# Patient Record
Sex: MALE | Race: WHITE | Employment: FULL TIME | URBAN - NONMETROPOLITAN AREA
[De-identification: names, ages, dates, MRNs, and addresses within clinical notes are randomized per-mention and may not be internally consistent; named-entity substitution may affect disease eponyms.]

---

## 2019-11-26 ENCOUNTER — OFFICE VISIT (OUTPATIENT)
Dept: PRIMARY CARE CLINIC | Age: 54
End: 2019-11-26
Payer: COMMERCIAL

## 2019-11-26 VITALS
BODY MASS INDEX: 28.86 KG/M2 | SYSTOLIC BLOOD PRESSURE: 169 MMHG | WEIGHT: 204 LBS | TEMPERATURE: 98 F | OXYGEN SATURATION: 99 % | DIASTOLIC BLOOD PRESSURE: 107 MMHG | HEART RATE: 102 BPM

## 2019-11-26 DIAGNOSIS — J01.40 ACUTE PANSINUSITIS, RECURRENCE NOT SPECIFIED: Primary | ICD-10-CM

## 2019-11-26 PROCEDURE — 99213 OFFICE O/P EST LOW 20 MIN: CPT | Performed by: NURSE PRACTITIONER

## 2019-11-26 RX ORDER — AMOXICILLIN AND CLAVULANATE POTASSIUM 875; 125 MG/1; MG/1
1 TABLET, FILM COATED ORAL 2 TIMES DAILY
Qty: 20 TABLET | Refills: 0 | Status: SHIPPED | OUTPATIENT
Start: 2019-11-26 | End: 2019-12-06

## 2019-11-26 RX ORDER — FLUTICASONE PROPIONATE 50 MCG
2 SPRAY, SUSPENSION (ML) NASAL DAILY
Qty: 1 BOTTLE | Refills: 0 | Status: SHIPPED | OUTPATIENT
Start: 2019-11-26 | End: 2020-03-20

## 2019-11-26 ASSESSMENT — ENCOUNTER SYMPTOMS
RHINORRHEA: 1
WHEEZING: 0
SINUS PAIN: 1
SINUS PRESSURE: 1
SORE THROAT: 1
COUGH: 1
GASTROINTESTINAL NEGATIVE: 1
SHORTNESS OF BREATH: 0
EYES NEGATIVE: 1

## 2020-03-20 ENCOUNTER — OFFICE VISIT (OUTPATIENT)
Dept: FAMILY MEDICINE CLINIC | Age: 55
End: 2020-03-20
Payer: COMMERCIAL

## 2020-03-20 VITALS
BODY MASS INDEX: 28.04 KG/M2 | HEIGHT: 72 IN | DIASTOLIC BLOOD PRESSURE: 90 MMHG | OXYGEN SATURATION: 98 % | WEIGHT: 207 LBS | SYSTOLIC BLOOD PRESSURE: 180 MMHG | TEMPERATURE: 98.2 F | HEART RATE: 96 BPM

## 2020-03-20 PROCEDURE — 99213 OFFICE O/P EST LOW 20 MIN: CPT | Performed by: NURSE PRACTITIONER

## 2020-03-20 RX ORDER — LISINOPRIL 20 MG/1
20 TABLET ORAL DAILY
Qty: 30 TABLET | Refills: 2 | Status: SHIPPED | OUTPATIENT
Start: 2020-03-20 | End: 2020-04-21 | Stop reason: SDUPTHER

## 2020-03-20 SDOH — ECONOMIC STABILITY: FOOD INSECURITY: WITHIN THE PAST 12 MONTHS, THE FOOD YOU BOUGHT JUST DIDN'T LAST AND YOU DIDN'T HAVE MONEY TO GET MORE.: NEVER TRUE

## 2020-03-20 SDOH — ECONOMIC STABILITY: FOOD INSECURITY: WITHIN THE PAST 12 MONTHS, YOU WORRIED THAT YOUR FOOD WOULD RUN OUT BEFORE YOU GOT MONEY TO BUY MORE.: NEVER TRUE

## 2020-03-20 SDOH — ECONOMIC STABILITY: TRANSPORTATION INSECURITY
IN THE PAST 12 MONTHS, HAS LACK OF TRANSPORTATION KEPT YOU FROM MEETINGS, WORK, OR FROM GETTING THINGS NEEDED FOR DAILY LIVING?: NO

## 2020-03-20 SDOH — ECONOMIC STABILITY: TRANSPORTATION INSECURITY
IN THE PAST 12 MONTHS, HAS THE LACK OF TRANSPORTATION KEPT YOU FROM MEDICAL APPOINTMENTS OR FROM GETTING MEDICATIONS?: NO

## 2020-03-20 SDOH — ECONOMIC STABILITY: INCOME INSECURITY: HOW HARD IS IT FOR YOU TO PAY FOR THE VERY BASICS LIKE FOOD, HOUSING, MEDICAL CARE, AND HEATING?: NOT HARD AT ALL

## 2020-03-20 ASSESSMENT — PATIENT HEALTH QUESTIONNAIRE - PHQ9
SUM OF ALL RESPONSES TO PHQ9 QUESTIONS 1 & 2: 0
2. FEELING DOWN, DEPRESSED OR HOPELESS: 0
SUM OF ALL RESPONSES TO PHQ QUESTIONS 1-9: 0
1. LITTLE INTEREST OR PLEASURE IN DOING THINGS: 0
SUM OF ALL RESPONSES TO PHQ QUESTIONS 1-9: 0

## 2020-03-20 ASSESSMENT — ENCOUNTER SYMPTOMS
COUGH: 1
SINUS PAIN: 1
SORE THROAT: 1
NAUSEA: 0
SWOLLEN GLANDS: 1
DIARRHEA: 0
VOMITING: 0

## 2020-03-20 NOTE — LETTER
[address] [id number]  Phone: [phone]  Fax: Leo Carvalho [phone], APRN - CNP        March 20, 2020    Bryon Zaidi  [address] [facility] [address]      Dear Heather Hernandez:    Coricidin HBP Cold and Cough 1 tab every 6 hours as needed (nasal decongestant and antihistamine)  Flonase 1 spray each nostril twice daily (nasal steroid)  Ibuprofen 3 times a day (antiinflammatory)   Warm tea with 1tbsp honey (soothes the throat)  Increase water intake  Rest    If you have any questions or concerns, please don't hesitate to call.     Sincerely,          QING Carson - NILO

## 2020-03-20 NOTE — PROGRESS NOTES
family history on file. Review of Systems:         Review of Systems   HENT: Positive for congestion, sinus pain and sore throat. Negative for sneezing. Respiratory: Positive for cough. Gastrointestinal: Negative for diarrhea, nausea and vomiting. Neurological: Positive for headaches. Physical Exam:     Vitals:  BP (!) 180/90 (Site: Right Upper Arm)   Pulse 96   Temp 98.2 °F (36.8 °C) (Oral)   Ht 6' (1.829 m)   Wt 207 lb (93.9 kg)   SpO2 98%   BMI 28.07 kg/m²       Physical Exam  Vitals signs and nursing note reviewed. Constitutional:       Appearance: He is well-developed. HENT:      Head: Normocephalic. Right Ear: Hearing, tympanic membrane and external ear normal.      Left Ear: Tympanic membrane and external ear normal.      Nose: Nose normal.      Mouth/Throat:      Pharynx: Uvula midline. Posterior oropharyngeal erythema present. No oropharyngeal exudate. Eyes:      Conjunctiva/sclera: Conjunctivae normal.      Pupils: Pupils are equal, round, and reactive to light. Neck:      Musculoskeletal: Normal range of motion and neck supple. Vascular: No carotid bruit. Cardiovascular:      Rate and Rhythm: Normal rate and regular rhythm. Pulses:           Dorsalis pedis pulses are 2+ on the right side and 2+ on the left side. Heart sounds: Normal heart sounds, S1 normal and S2 normal.   Pulmonary:      Effort: Pulmonary effort is normal. No respiratory distress. Breath sounds: Normal breath sounds. Abdominal:      Palpations: Abdomen is soft. Tenderness: There is no abdominal tenderness. Musculoskeletal: Normal range of motion. Skin:     General: Skin is warm and dry. Findings: No rash. Neurological:      Mental Status: He is alert and oriented to person, place, and time. GCS: GCS eye subscore is 4. GCS verbal subscore is 5. GCS motor subscore is 6. Deep Tendon Reflexes: Reflexes are normal and symmetric.    Psychiatric: No orders of the defined types were placed in this encounter. There are no Patient Instructions on file for this visit. Electronically signed by QING Martinez CNP on 3/20/2020 at 2:49 PM           Completed Refills      Requested Prescriptions     Signed Prescriptions Disp Refills    lisinopril (PRINIVIL;ZESTRIL) 20 MG tablet 30 tablet 2     Sig: Take 1 tablet by mouth daily         Candy Mons received counseling on the following healthy behaviors: nutrition, exercise, medication adherence and tobacco cessation  Reviewed prior labs and health maintenance. Continue current medications, diet and exercise. Discussed use, benefit, and side effects of prescribed medications. Barriers to medication compliance addressed. Patient given educational materials - see patient instructions. All patient questions answered. Patient voiced understanding.

## 2020-04-21 ENCOUNTER — OFFICE VISIT (OUTPATIENT)
Dept: FAMILY MEDICINE CLINIC | Age: 55
End: 2020-04-21
Payer: COMMERCIAL

## 2020-04-21 VITALS
BODY MASS INDEX: 27.9 KG/M2 | DIASTOLIC BLOOD PRESSURE: 78 MMHG | SYSTOLIC BLOOD PRESSURE: 160 MMHG | HEART RATE: 93 BPM | HEIGHT: 72 IN | WEIGHT: 206 LBS | OXYGEN SATURATION: 98 %

## 2020-04-21 LAB
CONTROL: PRESENT
HEMOCCULT STL QL: POSITIVE

## 2020-04-21 PROCEDURE — 99213 OFFICE O/P EST LOW 20 MIN: CPT | Performed by: NURSE PRACTITIONER

## 2020-04-21 PROCEDURE — 82274 ASSAY TEST FOR BLOOD FECAL: CPT | Performed by: NURSE PRACTITIONER

## 2020-04-21 RX ORDER — LISINOPRIL 40 MG/1
40 TABLET ORAL DAILY
Qty: 30 TABLET | Refills: 2 | Status: SHIPPED | OUTPATIENT
Start: 2020-04-21 | End: 2020-07-16 | Stop reason: SDUPTHER

## 2020-04-21 ASSESSMENT — ENCOUNTER SYMPTOMS
VOMITING: 0
COUGH: 0
DIARRHEA: 0
NAUSEA: 0
SHORTNESS OF BREATH: 0

## 2020-04-21 NOTE — PATIENT INSTRUCTIONS
SURVEY:    You may be receiving a survey from [employer] regarding your visit today. You may get this in the mail, through your MyChart or in your email. Please complete the survey to enable us to provide the highest quality of care to you and your family. If you cannot score us as very good ( 5 Stars) on any question, please feel free to call the office to discuss how we could have made your experience exceptional.     Thank you.     Clinical Care Team:         QING Wild-NILO Cole, [address] [place] [address] Team:                Michelle España

## 2020-04-21 NOTE — PROGRESS NOTES
Negative for cough and shortness of breath. Cardiovascular: Negative for chest pain and palpitations. Gastrointestinal: Negative for diarrhea, nausea and vomiting. Neurological: Negative for dizziness, seizures and headaches. Physical Exam:     Vitals:  BP (!) 160/78 (Site: Left Upper Arm, Position: Sitting, Cuff Size: Medium Adult)   Pulse 93   Ht 6' (1.829 m)   Wt 206 lb (93.4 kg)   SpO2 98%   BMI 27.94 kg/m²       Physical Exam  Vitals signs and nursing note reviewed. Constitutional:       Appearance: He is well-developed. Cardiovascular:      Rate and Rhythm: Normal rate and regular rhythm. Heart sounds: S1 normal and S2 normal.   Pulmonary:      Effort: Pulmonary effort is normal. No respiratory distress. Breath sounds: Normal breath sounds. Abdominal:      General: Bowel sounds are normal.      Palpations: Abdomen is soft. Tenderness: There is no abdominal tenderness. Skin:     General: Skin is warm and dry. Psychiatric:         Behavior: Behavior is cooperative. Data:     No results found for: NA, K, CL, CO2, BUN, CREATININE, GLUCOSE, PROT, LABALBU, BILITOT, ALKPHOS, AST, ALT  No results found for: WBC, RBC, HGB, HCT, MCV, MCH, MCHC, RDW, PLT, MPV  No results found for: TSH  No results found for: CHOL, HDL, PSA, LABA1C       Assessment & Plan        Diagnosis Orders   1. Essential hypertension  --BP not controlled at this time, however it is lower than previous. Will increase lisinopril to 40 mg p.o. daily. Will recheck in about 4 to 6 weeks. 2. Positive FIT (fecal immunochemical test)  --Patient advised that he should get a diagnostic colonoscopy. Patient refuses this. Patient educated that the source of the occult blood is not known and could be something simple like hemorrhoids or it could be something complex like colon cancer. Patient educated that with all cancers early detection is the matt to good outcomes.   Patient educated about how smoking increases the overall risk also. Patient refuses referral for Dr Rico Painting. Patient states \"when I get more financially stable I will think about it. Will have to go at some point\". Patient verbalizes understanding and agreement with plan. All questions answered. If symptoms do not resolve or worsen, return to office. Completed Refills   Requested Prescriptions     Signed Prescriptions Disp Refills    lisinopril (PRINIVIL;ZESTRIL) 40 MG tablet 30 tablet 2     Sig: Take 1 tablet by mouth daily     No follow-ups on file. Orders Placed This Encounter   Medications    lisinopril (PRINIVIL;ZESTRIL) 40 MG tablet     Sig: Take 1 tablet by mouth daily     Dispense:  30 tablet     Refill:  2     Orders Placed This Encounter   Procedures    POCT Fecal Immunochemical Test (FIT)     Colorectal cancer screening in lieu of colonoscopy         Patient Instructions   SURVEY:    You may be receiving a survey from [employer] regarding your visit today. You may get this in the mail, through your MyChart or in your email. Please complete the survey to enable us to provide the highest quality of care to you and your family. If you cannot score us as very good ( 5 Stars) on any question, please feel free to call the office to discuss how we could have made your experience exceptional.     Thank you.     Clinical Care Team:         MINDY Goldmannett, [address] [place] [address] Team:                Shaunna Quiroz                                     Electronically signed by QING Goldman CNP on 4/21/2020 at 1:27 PM           Completed Refills      Requested Prescriptions     Signed Prescriptions Disp Refills    lisinopril (PRINIVIL;ZESTRIL) 40 MG tablet 30 tablet 2     Sig: Take 1 tablet by mouth daily         Monalisa Harada received counseling on the following healthy behaviors: nutrition, exercise and medication adherence  Reviewed prior labs and health maintenance. Continue current medications, diet and exercise. Discussed use, benefit, and side effects of prescribed medications. Barriers to medication compliance addressed. Patient given educational materials - see patient instructions. All patient questions answered. Patient voiced understanding.

## 2020-06-11 ENCOUNTER — OFFICE VISIT (OUTPATIENT)
Dept: FAMILY MEDICINE CLINIC | Age: 55
End: 2020-06-11
Payer: COMMERCIAL

## 2020-06-11 VITALS
WEIGHT: 201 LBS | DIASTOLIC BLOOD PRESSURE: 90 MMHG | BODY MASS INDEX: 27.26 KG/M2 | OXYGEN SATURATION: 98 % | SYSTOLIC BLOOD PRESSURE: 160 MMHG | HEART RATE: 90 BPM

## 2020-06-11 PROCEDURE — 99213 OFFICE O/P EST LOW 20 MIN: CPT | Performed by: NURSE PRACTITIONER

## 2020-06-11 RX ORDER — METOPROLOL SUCCINATE 50 MG/1
50 TABLET, EXTENDED RELEASE ORAL DAILY
Qty: 30 TABLET | Refills: 2 | Status: SHIPPED | OUTPATIENT
Start: 2020-06-11 | End: 2020-09-17 | Stop reason: SDUPTHER

## 2020-06-11 ASSESSMENT — ENCOUNTER SYMPTOMS
VOMITING: 0
NAUSEA: 0
COUGH: 0
SHORTNESS OF BREATH: 0
DIARRHEA: 0

## 2020-06-11 NOTE — PROGRESS NOTES
HPI Notes    Name: Олег Merrill  : 1965         Chief Complaint:     Chief Complaint   Patient presents with    Hypertension     Patient here today for recheck on HTN       History of Present Illness:        Hypertension   This is a chronic problem. The current episode started more than 1 month ago. The problem has been waxing and waning since onset. The problem is uncontrolled. Pertinent negatives include no chest pain, headaches, palpitations, peripheral edema or shortness of breath. Risk factors for coronary artery disease include male gender, smoking/tobacco exposure and stress. Past treatments include ACE inhibitors. The current treatment provides mild improvement. Past Medical History:     Past Medical History:   Diagnosis Date    Chronic back pain       Reviewed all health maintenance requirements and ordered appropriate tests  Health Maintenance Due   Topic Date Due    Potassium monitoring  1966    Creatinine monitoring  1966    Pneumococcal 0-64 years Vaccine (1 of 1 - PPSV23) 1972    HIV screen  1981    DTaP/Tdap/Td vaccine (1 - Tdap) 1985    Lipid screen  2006    Diabetes screen  2006    Shingles Vaccine (1 of 2) 2016       Past Surgical History:     Past Surgical History:   Procedure Laterality Date    HERNIA REPAIR          Medications:       Prior to Admission medications    Medication Sig Start Date End Date Taking? Authorizing Provider   metoprolol succinate (TOPROL XL) 50 MG extended release tablet Take 1 tablet by mouth daily 20  Yes QING Marsh CNP   lisinopril (PRINIVIL;ZESTRIL) 40 MG tablet Take 1 tablet by mouth daily 20  Yes QING Marsh CNP        Allergies:       Patient has no known allergies. Social History:     Tobacco:    reports that he has been smoking cigarettes. He has never used smokeless tobacco.  Alcohol:      reports current alcohol use.   Drug Use:  has no

## 2020-06-25 ENCOUNTER — NURSE ONLY (OUTPATIENT)
Dept: FAMILY MEDICINE CLINIC | Age: 55
End: 2020-06-25

## 2020-06-25 VITALS — DIASTOLIC BLOOD PRESSURE: 80 MMHG | SYSTOLIC BLOOD PRESSURE: 138 MMHG | HEART RATE: 90 BPM

## 2020-06-25 NOTE — PROGRESS NOTES
BP reviewed with MINDY Vital. Patient to continue with same medications and be seen in 3 weeks. /82, and 138/80.  Pulse 90

## 2020-07-16 ENCOUNTER — OFFICE VISIT (OUTPATIENT)
Dept: FAMILY MEDICINE CLINIC | Age: 55
End: 2020-07-16
Payer: COMMERCIAL

## 2020-07-16 VITALS
BODY MASS INDEX: 27.94 KG/M2 | DIASTOLIC BLOOD PRESSURE: 94 MMHG | SYSTOLIC BLOOD PRESSURE: 162 MMHG | OXYGEN SATURATION: 97 % | WEIGHT: 206 LBS | HEART RATE: 90 BPM

## 2020-07-16 PROCEDURE — 99214 OFFICE O/P EST MOD 30 MIN: CPT | Performed by: NURSE PRACTITIONER

## 2020-07-16 RX ORDER — LISINOPRIL 40 MG/1
40 TABLET ORAL DAILY
Qty: 30 TABLET | Refills: 1 | Status: SHIPPED | OUTPATIENT
Start: 2020-07-16 | End: 2020-09-17 | Stop reason: SDUPTHER

## 2020-07-16 ASSESSMENT — ENCOUNTER SYMPTOMS
VOMITING: 0
COUGH: 0
SHORTNESS OF BREATH: 0
NAUSEA: 0
DIARRHEA: 0

## 2020-07-16 NOTE — PROGRESS NOTES
HPI Notes    Name: Tomasz Becker  : 1965         Chief Complaint:     Chief Complaint   Patient presents with    Hypertension     Patient here today for HTN check up. He has not had BP meds yet today, he takes them after work daily. History of Present Illness:        Hypertension   This is a chronic problem. The current episode started more than 1 year ago. The problem is controlled. Pertinent negatives include no chest pain, headaches, palpitations, peripheral edema or shortness of breath. Risk factors for coronary artery disease include male gender, obesity and smoking/tobacco exposure. Past treatments include calcium channel blockers and ACE inhibitors. The current treatment provides moderate improvement. Compliance problems include diet and exercise. Past Medical History:     Past Medical History:   Diagnosis Date    Chronic back pain       Reviewed all health maintenance requirements and ordered appropriate tests  Health Maintenance Due   Topic Date Due    Potassium monitoring  1966    Creatinine monitoring  1966    Pneumococcal 0-64 years Vaccine (1 of 1 - PPSV23) 1972    HIV screen  1981    DTaP/Tdap/Td vaccine (1 - Tdap) 1985    Lipid screen  2006    Diabetes screen  2006    Shingles Vaccine (1 of 2) 2016       Past Surgical History:     Past Surgical History:   Procedure Laterality Date    HERNIA REPAIR          Medications:       Prior to Admission medications    Medication Sig Start Date End Date Taking? Authorizing Provider   metoprolol succinate (TOPROL XL) 50 MG extended release tablet Take 1 tablet by mouth daily 20  Yes QING Ross CNP   lisinopril (PRINIVIL;ZESTRIL) 40 MG tablet Take 1 tablet by mouth daily 20  Yes QING Ross CNP        Allergies:       Patient has no known allergies. Social History:     Tobacco:    reports that he has been smoking cigarettes.  He has never used smokeless tobacco.  Alcohol:      reports current alcohol use. Drug Use:  has no history on file for drug. Family History:      No family history on file. Review of Systems:         Review of Systems   Constitutional: Negative for chills and fever. Respiratory: Negative for cough and shortness of breath. Cardiovascular: Negative for chest pain and palpitations. Gastrointestinal: Negative for diarrhea, nausea and vomiting. Neurological: Negative for dizziness, seizures and headaches. Physical Exam:     Vitals:  BP (!) 162/94 (Site: Left Upper Arm, Position: Sitting, Cuff Size: Medium Adult)   Pulse 90   Wt 206 lb (93.4 kg)   SpO2 97%   BMI 27.94 kg/m²       Physical Exam  Vitals signs and nursing note reviewed. Constitutional:       Appearance: He is well-developed. Cardiovascular:      Rate and Rhythm: Normal rate and regular rhythm. Heart sounds: S1 normal and S2 normal.   Pulmonary:      Effort: Pulmonary effort is normal. No respiratory distress. Breath sounds: Normal breath sounds. Abdominal:      General: Bowel sounds are normal.      Palpations: Abdomen is soft. Tenderness: There is no abdominal tenderness. Skin:     General: Skin is warm and dry. Psychiatric:         Behavior: Behavior is cooperative. Data:     No results found for: NA, K, CL, CO2, BUN, CREATININE, GLUCOSE, PROT, LABALBU, BILITOT, ALKPHOS, AST, ALT  No results found for: WBC, RBC, HGB, HCT, MCV, MCH, MCHC, RDW, PLT, MPV  No results found for: TSH  No results found for: CHOL, HDL, PSA, LABA1C       Assessment & Plan        Diagnosis Orders   1. Essential hypertension       Patient not willing to change any medications at this time. Patient instructed to bring multiple blood pressure and pulse readings to the office over the next 2 to 3 weeks. Will make a determination at that time. Patient verbalizes understanding and agreement with plan. All questions answered.  If symptoms do not resolve or worsen, return to office. Completed Refills   Requested Prescriptions      No prescriptions requested or ordered in this encounter     No follow-ups on file. No orders of the defined types were placed in this encounter. No orders of the defined types were placed in this encounter. Patient Instructions   SURVEY:    You may be receiving a survey from [employer] regarding your visit today. Please complete the survey to enable us to provide the highest quality of care to you and your family. If you cannot score us a very good (5 Stars) on any question, please call the office to discuss how we could have made your experience a very good one. Thank you. Clinical Care Team: MINDY Olson LPN    Clerical Team: Demi Luis        Electronically signed by QING Olson CNP on 7/16/2020 at 3:37 PM           Completed Refills      Requested Prescriptions      No prescriptions requested or ordered in this encounter         Marvin Tinsley received counseling on the following healthy behaviors: medication adherence  Reviewed prior labs and health maintenance. Continue current medications, diet and exercise. Discussed use, benefit, and side effects of prescribed medications. Barriers to medication compliance addressed. Patient given educational materials - see patient instructions. All patient questions answered. Patient voiced understanding.

## 2020-07-16 NOTE — PATIENT INSTRUCTIONS
SURVEY:    You may be receiving a survey from [employer] regarding your visit today. Please complete the survey to enable us to provide the highest quality of care to you and your family. If you cannot score us a very good (5 Stars) on any question, please call the office to discuss how we could have made your experience a very good one. Thank you.     Clinical Care Team: QING Bahena-NILO Bianchi LPN    Clerical Team: Franco Polanco

## 2020-09-17 ENCOUNTER — OFFICE VISIT (OUTPATIENT)
Dept: FAMILY MEDICINE CLINIC | Age: 55
End: 2020-09-17
Payer: COMMERCIAL

## 2020-09-17 VITALS
SYSTOLIC BLOOD PRESSURE: 162 MMHG | DIASTOLIC BLOOD PRESSURE: 84 MMHG | HEIGHT: 72 IN | OXYGEN SATURATION: 98 % | TEMPERATURE: 99.1 F | HEART RATE: 97 BPM | WEIGHT: 204 LBS | BODY MASS INDEX: 27.63 KG/M2

## 2020-09-17 PROCEDURE — 99213 OFFICE O/P EST LOW 20 MIN: CPT | Performed by: NURSE PRACTITIONER

## 2020-09-17 RX ORDER — LISINOPRIL 40 MG/1
40 TABLET ORAL DAILY
Qty: 30 TABLET | Refills: 2 | Status: SHIPPED | OUTPATIENT
Start: 2020-09-17 | End: 2020-12-17 | Stop reason: SDUPTHER

## 2020-09-17 RX ORDER — METOPROLOL SUCCINATE 100 MG/1
100 TABLET, EXTENDED RELEASE ORAL DAILY
Qty: 30 TABLET | Refills: 2 | Status: SHIPPED | OUTPATIENT
Start: 2020-09-17 | End: 2020-12-17 | Stop reason: SDUPTHER

## 2020-09-17 RX ORDER — BLOOD PRESSURE TEST KIT
1 KIT MISCELLANEOUS DAILY
Qty: 1 KIT | Refills: 0 | Status: SHIPPED | OUTPATIENT
Start: 2020-09-17

## 2020-09-17 ASSESSMENT — ENCOUNTER SYMPTOMS
DIARRHEA: 0
NAUSEA: 0
VOMITING: 0
COUGH: 0
SHORTNESS OF BREATH: 0

## 2020-09-17 NOTE — PROGRESS NOTES
HPI Notes    Name: Peggy Ashley  : 1965         Chief Complaint:     Chief Complaint   Patient presents with    Hypertension     Patient presents today for a 2 month check up        History of Present Illness:        Hypertension   This is a chronic problem. The current episode started more than 1 year ago. The problem is uncontrolled. Pertinent negatives include no chest pain, headaches, palpitations, peripheral edema or shortness of breath. Risk factors for coronary artery disease include smoking/tobacco exposure, stress and male gender. Past treatments include ACE inhibitors and beta blockers. The current treatment provides moderate improvement. Compliance problems include diet and exercise. Past Medical History:     Past Medical History:   Diagnosis Date    Chronic back pain       Reviewed all health maintenance requirements and ordered appropriate tests  Health Maintenance Due   Topic Date Due    Potassium monitoring  1966    Creatinine monitoring  1966    Pneumococcal 0-64 years Vaccine (1 of 1 - PPSV23) 1972    HIV screen  1981    DTaP/Tdap/Td vaccine (1 - Tdap) 1985    Lipid screen  2006    Diabetes screen  2006    Shingles Vaccine (1 of 2) 2016    Flu vaccine (1) 2020       Past Surgical History:     Past Surgical History:   Procedure Laterality Date    HERNIA REPAIR          Medications:       Prior to Admission medications    Medication Sig Start Date End Date Taking? Authorizing Provider   metoprolol succinate (TOPROL XL) 100 MG extended release tablet Take 1 tablet by mouth daily 20  Yes Evaristo Ganser, APRN - CNP   Blood Pressure KIT 1 Dose by Does not apply route daily 20  Yes Evaristo Ganser, APRN - CNP   lisinopril (PRINIVIL;ZESTRIL) 40 MG tablet Take 1 tablet by mouth daily 20  Yes Evaristo Ganser, APRN - CNP        Allergies:       Patient has no known allergies.     Social History: Tobacco:    reports that he has been smoking cigarettes. He has never used smokeless tobacco.  Alcohol:      reports current alcohol use. Drug Use:  has no history on file for drug. Family History:      No family history on file. Review of Systems:         Review of Systems   Constitutional: Negative for chills and fever. Respiratory: Negative for cough and shortness of breath. Cardiovascular: Negative for chest pain and palpitations. Gastrointestinal: Negative for diarrhea, nausea and vomiting. Neurological: Negative for dizziness, seizures and headaches. Physical Exam:     Vitals:  BP (!) 162/84 (Site: Right Upper Arm, Position: Sitting, Cuff Size: Medium Adult)   Pulse 97   Temp 99.1 °F (37.3 °C) (Oral)   Ht 6' (1.829 m)   Wt 204 lb (92.5 kg)   SpO2 98%   BMI 27.67 kg/m²       Physical Exam  Vitals signs and nursing note reviewed. Constitutional:       Appearance: He is well-developed. Cardiovascular:      Rate and Rhythm: Normal rate and regular rhythm. Heart sounds: S1 normal and S2 normal.   Pulmonary:      Effort: Pulmonary effort is normal. No respiratory distress. Breath sounds: Normal breath sounds. Abdominal:      General: Bowel sounds are normal.      Palpations: Abdomen is soft. Tenderness: There is no abdominal tenderness. Skin:     General: Skin is warm and dry. Psychiatric:         Behavior: Behavior is cooperative. Data:     No results found for: NA, K, CL, CO2, BUN, CREATININE, GLUCOSE, PROT, LABALBU, BILITOT, ALKPHOS, AST, ALT  No results found for: WBC, RBC, HGB, HCT, MCV, MCH, MCHC, RDW, PLT, MPV  No results found for: TSH  No results found for: CHOL, HDL, PSA, LABA1C       Assessment & Plan        Diagnosis Orders   1. Essential hypertension  lisinopril (PRINIVIL;ZESTRIL) 40 MG tablet     BP still uncontrolled at this time. Continue lisinopril 40 mg p.o. daily. Will increase Toprol-XL to 100 mg p.o. daily.   Will give patient home BP cuff to monitor. Patient educated to record date, blood pressure, and heart rate and bring to next appointment. Patient verbalizes understanding and agreement with plan. All questions answered. If symptoms do not resolve or worsen, return to office. Completed Refills   Requested Prescriptions     Signed Prescriptions Disp Refills    metoprolol succinate (TOPROL XL) 100 MG extended release tablet 30 tablet 2     Sig: Take 1 tablet by mouth daily    Blood Pressure KIT 1 kit 0     Si Dose by Does not apply route daily    lisinopril (PRINIVIL;ZESTRIL) 40 MG tablet 30 tablet 2     Sig: Take 1 tablet by mouth daily     No follow-ups on file. Orders Placed This Encounter   Medications    metoprolol succinate (TOPROL XL) 100 MG extended release tablet     Sig: Take 1 tablet by mouth daily     Dispense:  30 tablet     Refill:  2    Blood Pressure KIT     Si Dose by Does not apply route daily     Dispense:  1 kit     Refill:  0    lisinopril (PRINIVIL;ZESTRIL) 40 MG tablet     Sig: Take 1 tablet by mouth daily     Dispense:  30 tablet     Refill:  2     No orders of the defined types were placed in this encounter. Patient Instructions     SURVEY:    You may be receiving a survey from [employer] regarding your visit today. Please complete the survey to enable us to provide the highest quality of care to you and your family. If you cannot score us a very good on any question, please call the office to discuss how we could of made your experience a very good one. Thank you.         Electronically signed by QING Paredes CNP on 2020 at 1:31 PM           Completed Refills      Requested Prescriptions     Signed Prescriptions Disp Refills    metoprolol succinate (TOPROL XL) 100 MG extended release tablet 30 tablet 2     Sig: Take 1 tablet by mouth daily    Blood Pressure KIT 1 kit 0     Si Dose by Does not apply route daily    lisinopril (PRINIVIL;ZESTRIL) 40 MG tablet 30 tablet 2     Sig: Take 1 tablet by mouth daily         Sonia Courser received counseling on the following healthy behaviors: nutrition, exercise, medication adherence and tobacco cessation  Reviewed prior labs and health maintenance. Continue current medications, diet and exercise. Discussed use, benefit, and side effects of prescribed medications. Barriers to medication compliance addressed. Patient given educational materials - see patient instructions. All patient questions answered. Patient voiced understanding.

## 2020-09-17 NOTE — PATIENT INSTRUCTIONS
SURVEY:    You may be receiving a survey from [employer] regarding your visit today. Please complete the survey to enable us to provide the highest quality of care to you and your family. If you cannot score us a very good on any question, please call the office to discuss how we could of made your experience a very good one. Thank you.

## 2020-12-17 ENCOUNTER — OFFICE VISIT (OUTPATIENT)
Dept: FAMILY MEDICINE CLINIC | Age: 55
End: 2020-12-17
Payer: COMMERCIAL

## 2020-12-17 VITALS
WEIGHT: 207 LBS | DIASTOLIC BLOOD PRESSURE: 90 MMHG | OXYGEN SATURATION: 96 % | HEART RATE: 82 BPM | SYSTOLIC BLOOD PRESSURE: 150 MMHG | BODY MASS INDEX: 28.07 KG/M2

## 2020-12-17 PROCEDURE — 99214 OFFICE O/P EST MOD 30 MIN: CPT | Performed by: STUDENT IN AN ORGANIZED HEALTH CARE EDUCATION/TRAINING PROGRAM

## 2020-12-17 RX ORDER — LISINOPRIL 40 MG/1
40 TABLET ORAL DAILY
Qty: 30 TABLET | Refills: 2 | Status: SHIPPED | OUTPATIENT
Start: 2020-12-17 | End: 2021-02-15 | Stop reason: SDUPTHER

## 2020-12-17 RX ORDER — METOPROLOL SUCCINATE 100 MG/1
100 TABLET, EXTENDED RELEASE ORAL DAILY
Qty: 30 TABLET | Refills: 2 | Status: SHIPPED | OUTPATIENT
Start: 2020-12-17 | End: 2021-02-15 | Stop reason: SDUPTHER

## 2020-12-17 ASSESSMENT — ENCOUNTER SYMPTOMS
BACK PAIN: 0
COUGH: 0
SINUS PAIN: 0
SORE THROAT: 0
ABDOMINAL PAIN: 0
NAUSEA: 0
WHEEZING: 0
DIARRHEA: 0
VOMITING: 0

## 2020-12-17 NOTE — PATIENT INSTRUCTIONS
Patient Education        DASH Diet: Care Instructions  Your Care Instructions     The DASH diet is an eating plan that can help lower your blood pressure. DASH stands for Dietary Approaches to Stop Hypertension. Hypertension is high blood pressure. The DASH diet focuses on eating foods that are high in calcium, potassium, and magnesium. These nutrients can lower blood pressure. The foods that are highest in these nutrients are fruits, vegetables, low-fat dairy products, nuts, seeds, and legumes. But taking calcium, potassium, and magnesium supplements instead of eating foods that are high in those nutrients does not have the same effect. The DASH diet also includes whole grains, fish, and poultry. The DASH diet is one of several lifestyle changes your doctor may recommend to lower your high blood pressure. Your doctor may also want you to decrease the amount of sodium in your diet. Lowering sodium while following the DASH diet can lower blood pressure even further than just the DASH diet alone. Follow-up care is a key part of your treatment and safety. Be sure to make and go to all appointments, and call your doctor if you are having problems. It's also a good idea to know your test results and keep a list of the medicines you take. How can you care for yourself at home? Following the DASH diet  · Eat 4 to 5 servings of fruit each day. A serving is 1 medium-sized piece of fruit, ½ cup chopped or canned fruit, 1/4 cup dried fruit, or 4 ounces (½ cup) of fruit juice. Choose fruit more often than fruit juice. · Eat 4 to 5 servings of vegetables each day. A serving is 1 cup of lettuce or raw leafy vegetables, ½ cup of chopped or cooked vegetables, or 4 ounces (½ cup) of vegetable juice. Choose vegetables more often than vegetable juice. · Get 2 to 3 servings of low-fat and fat-free dairy each day. A serving is 8 ounces of milk, 1 cup of yogurt, or 1 ½ ounces of cheese. · Eat 6 to 8 servings of grains each day. A serving is 1 slice of bread, 1 ounce of dry cereal, or ½ cup of cooked rice, pasta, or cooked cereal. Try to choose whole-grain products as much as possible. · Limit lean meat, poultry, and fish to 2 servings each day. A serving is 3 ounces, about the size of a deck of cards. · Eat 4 to 5 servings of nuts, seeds, and legumes (cooked dried beans, lentils, and split peas) each week. A serving is 1/3 cup of nuts, 2 tablespoons of seeds, or ½ cup of cooked beans or peas. · Limit fats and oils to 2 to 3 servings each day. A serving is 1 teaspoon of vegetable oil or 2 tablespoons of salad dressing. · Limit sweets and added sugars to 5 servings or less a week. A serving is 1 tablespoon jelly or jam, ½ cup sorbet, or 1 cup of lemonade. · Eat less than 2,300 milligrams (mg) of sodium a day. If you limit your sodium to 1,500 mg a day, you can lower your blood pressure even more. Tips for success  · Start small. Do not try to make dramatic changes to your diet all at once. You might feel that you are missing out on your favorite foods and then be more likely to not follow the plan. Make small changes, and stick with them. Once those changes become habit, add a few more changes. · Try some of the following:  ? Make it a goal to eat a fruit or vegetable at every meal and at snacks. This will make it easy to get the recommended amount of fruits and vegetables each day. ? Try yogurt topped with fruit and nuts for a snack or healthy dessert. ? Add lettuce, tomato, cucumber, and onion to sandwiches. ? Combine a ready-made pizza crust with low-fat mozzarella cheese and lots of vegetable toppings. Try using tomatoes, squash, spinach, broccoli, carrots, cauliflower, and onions. ? Have a variety of cut-up vegetables with a low-fat dip as an appetizer instead of chips and dip. ? Sprinkle sunflower seeds or chopped almonds over salads. Or try adding chopped walnuts or almonds to cooked vegetables. ? Try some vegetarian meals using beans and peas. Add garbanzo or kidney beans to salads. Make burritos and tacos with mashed bella beans or black beans. Where can you learn more? Go to [email or web address] and sign in to your [employer] account. Enter F394 in the ScalIT box to learn more about \"DASH Diet: Care Instructions. \"     If you do not have an account, please click on the \"Sign Up Now\" link. Current as of: December 16, 2019               Content Version: 12.6  © 5234-8387 [employer]. Care instructions adapted under license by [facility] ([facility]). If you have questions about a medical condition or this instruction, always ask your healthcare professional. [address] any warranty or liability for your use of this information.

## 2020-12-17 NOTE — PROGRESS NOTES
HPI Notes    Name: Wilmer Heart  : 1965         Chief Complaint:     Chief Complaint   Patient presents with    Hypertension     Patient here today for HTN check up       History of Present Illness:        HPI    This is a chronic problem. BP not at goal. Currently treated with Toprol-XL 100mg PO QD and Lisinopril 40mg PO QD. This has not been worked up medically. Had Toprol increased last time. Has presented with BP log. Usual values are around 145/80mmHg. Caffeine intake small, (16oz Mt. Dew per day). Describes his diet as \"not good\". Favorite junk foods include chips, pizzas, \"whatever I can get my hands on\". Does walk a significant amount at work, but not engaging in cardiovascular exercise. He states he needs refills of both antihypertensives. Past Medical History:     Past Medical History:   Diagnosis Date    Chronic back pain       Reviewed all health maintenance requirements and ordered appropriate tests  Health Maintenance Due   Topic Date Due    Potassium monitoring  1966    Creatinine monitoring  1966    HIV screen  1981    DTaP/Tdap/Td vaccine (1 - Tdap) 1985    Lipid screen  2006    Diabetes screen  2006    Shingles Vaccine (1 of 2) 2016       Past Surgical History:     Past Surgical History:   Procedure Laterality Date    HERNIA REPAIR          Medications:       Prior to Admission medications    Medication Sig Start Date End Date Taking? Authorizing Provider   lisinopril (PRINIVIL;ZESTRIL) 40 MG tablet Take 1 tablet by mouth daily 20  Yes Robinson Lawson DO   metoprolol succinate (TOPROL XL) 100 MG extended release tablet Take 1 tablet by mouth daily 20  Yes Robinson Lawson DO   Blood Pressure KIT 1 Dose by Does not apply route daily 20  Yes QING Newby CNP        Allergies:       Patient has no known allergies.     Social History: Tobacco:    reports that he has been smoking cigarettes. He has never used smokeless tobacco.  Alcohol:      reports current alcohol use. Drug Use:  has no history on file for drug. Family History:     No family history on file. Review of Systems:         Review of Systems   Constitutional: Negative for fever. HENT: Negative for sinus pain, sneezing and sore throat. Respiratory: Negative for cough and wheezing. Cardiovascular: Negative for chest pain. Gastrointestinal: Negative for abdominal pain, diarrhea, nausea and vomiting. Musculoskeletal: Negative for back pain. Skin: Negative for rash. Neurological: Negative for dizziness, weakness and headaches. Hematological: Negative for adenopathy. Psychiatric/Behavioral: Negative for sleep disturbance. Physical Exam:     Vitals:  BP (!) 150/90 (Site: Left Upper Arm, Position: Sitting, Cuff Size: Large Adult)   Pulse 82   Wt 207 lb (93.9 kg)   SpO2 96%   BMI 28.07 kg/m²       Physical Exam  Vitals signs and nursing note reviewed. Constitutional:       General: He is not in acute distress. Appearance: Normal appearance. He is obese. Cardiovascular:      Rate and Rhythm: Normal rate and regular rhythm. Pulses: Normal pulses. Heart sounds: Normal heart sounds. No murmur. Pulmonary:      Effort: Pulmonary effort is normal. No respiratory distress. Breath sounds: Normal breath sounds. Musculoskeletal: Normal range of motion. General: No swelling or tenderness. Skin:     General: Skin is warm and dry. Capillary Refill: Capillary refill takes less than 2 seconds. Neurological:      General: No focal deficit present. Mental Status: He is alert and oriented to person, place, and time. Mental status is at baseline. Psychiatric:         Mood and Affect: Mood normal.         Behavior: Behavior normal.         Thought Content:  Thought content normal.                 Data: No results found for: NA, K, CL, CO2, BUN, CREATININE, GLUCOSE, PROT, LABALBU, BILITOT, ALKPHOS, AST, ALT  No results found for: WBC, RBC, HGB, HCT, MCV, MCH, MCHC, RDW, PLT, MPV  No results found for: TSH  No results found for: CHOL, HDL, PSA, LABA1C       Assessment & Plan        Diagnosis Orders   1. Essential hypertension  lisinopril (PRINIVIL;ZESTRIL) 40 MG tablet    metoprolol succinate (TOPROL XL) 100 MG extended release tablet    Comprehensive Metabolic Panel    Renin    Aldosterone   2. Medication refill         1. Would recommend continuing lisinopril 40 mg p.o. daily and Toprol- mg p.o. daily. I believe that we could achieve optimal blood pressure control with an additional antihypertensive, however, lifestyle factors have not been optimized. Patient is motivated to optimize lifestyle factors, including following the ComQi diet, increasing physical activity. We have a long discussion about what lower sodium and shopping in the cooking entails, patient states he does not desire further referral to dietitian for discussion of this and that he has a \"pretty good understanding of it\". I did provide him with a handout on the ComQi diet. We also recommended weight loss, particularly approximately 10% of his body weight being goal weight loss. We did discuss weight loss strategies, including calorie counting, and that a weight loss rate of 1 to 2 pounds per week is considered safe and optimal.    We have not worked up with his hypertension before, so I would not like to order a comprehensive metabolic panel, as well as renin, aldosterone. To evaluate for primary hypertension, additionally he will need creatinine and potassium monitoring since he was treated with an ACEI. Follow-up in 6 months for well visit.               Completed Refills   Requested Prescriptions     Signed Prescriptions Disp Refills    lisinopril (PRINIVIL;ZESTRIL) 40 MG tablet 30 tablet 2 Follow-up care is a key part of your treatment and safety. Be sure to make and go to all appointments, and call your doctor if you are having problems. It's also a good idea to know your test results and keep a list of the medicines you take. How can you care for yourself at home? Following the DASH diet  · Eat 4 to 5 servings of fruit each day. A serving is 1 medium-sized piece of fruit, ½ cup chopped or canned fruit, 1/4 cup dried fruit, or 4 ounces (½ cup) of fruit juice. Choose fruit more often than fruit juice. · Eat 4 to 5 servings of vegetables each day. A serving is 1 cup of lettuce or raw leafy vegetables, ½ cup of chopped or cooked vegetables, or 4 ounces (½ cup) of vegetable juice. Choose vegetables more often than vegetable juice. · Get 2 to 3 servings of low-fat and fat-free dairy each day. A serving is 8 ounces of milk, 1 cup of yogurt, or 1 ½ ounces of cheese. · Eat 6 to 8 servings of grains each day. A serving is 1 slice of bread, 1 ounce of dry cereal, or ½ cup of cooked rice, pasta, or cooked cereal. Try to choose whole-grain products as much as possible. · Limit lean meat, poultry, and fish to 2 servings each day. A serving is 3 ounces, about the size of a deck of cards. · Eat 4 to 5 servings of nuts, seeds, and legumes (cooked dried beans, lentils, and split peas) each week. A serving is 1/3 cup of nuts, 2 tablespoons of seeds, or ½ cup of cooked beans or peas. · Limit fats and oils to 2 to 3 servings each day. A serving is 1 teaspoon of vegetable oil or 2 tablespoons of salad dressing. · Limit sweets and added sugars to 5 servings or less a week. A serving is 1 tablespoon jelly or jam, ½ cup sorbet, or 1 cup of lemonade. · Eat less than 2,300 milligrams (mg) of sodium a day. If you limit your sodium to 1,500 mg a day, you can lower your blood pressure even more.   Tips for success

## 2021-01-29 ENCOUNTER — HOSPITAL ENCOUNTER (OUTPATIENT)
Age: 56
Discharge: HOME | End: 2021-01-29
Payer: COMMERCIAL

## 2021-01-29 DIAGNOSIS — I10 ESSENTIAL HYPERTENSION: ICD-10-CM

## 2021-01-29 LAB
ALBUMIN SERPL-MCNC: 4.4 G/DL (ref 3.5–5.2)
ALBUMIN/GLOBULIN RATIO: ABNORMAL (ref 1–2.5)
ALP BLD-CCNC: 81 U/L (ref 40–129)
ALT SERPL-CCNC: 43 U/L (ref 5–41)
ANION GAP SERPL CALCULATED.3IONS-SCNC: 9 MMOL/L (ref 9–17)
AST SERPL-CCNC: 31 U/L
BILIRUB SERPL-MCNC: 0.18 MG/DL (ref 0.3–1.2)
BUN BLDV-MCNC: 12 MG/DL (ref 6–20)
BUN/CREAT BLD: 13 (ref 9–20)
CALCIUM SERPL-MCNC: 9.7 MG/DL (ref 8.6–10.4)
CHLORIDE BLD-SCNC: 107 MMOL/L (ref 98–107)
CO2: 25 MMOL/L (ref 20–31)
CREAT SERPL-MCNC: 0.93 MG/DL (ref 0.7–1.2)
GFR AFRICAN AMERICAN: >60 ML/MIN
GFR NON-AFRICAN AMERICAN: >60 ML/MIN
GFR SERPL CREATININE-BSD FRML MDRD: ABNORMAL ML/MIN/{1.73_M2}
GFR SERPL CREATININE-BSD FRML MDRD: ABNORMAL ML/MIN/{1.73_M2}
GLUCOSE BLD-MCNC: 91 MG/DL (ref 70–99)
POTASSIUM SERPL-SCNC: 4 MMOL/L (ref 3.7–5.3)
SODIUM BLD-SCNC: 141 MMOL/L (ref 135–144)
TOTAL PROTEIN: 7.1 G/DL (ref 6.4–8.3)

## 2021-01-29 PROCEDURE — 84244 ASSAY OF RENIN: CPT

## 2021-01-29 PROCEDURE — 36415 COLL VENOUS BLD VENIPUNCTURE: CPT

## 2021-01-29 PROCEDURE — 82088 ASSAY OF ALDOSTERONE: CPT

## 2021-01-29 PROCEDURE — 80053 COMPREHEN METABOLIC PANEL: CPT

## 2021-01-31 LAB
ALDOSTERONE COMMENT: NORMAL
ALDOSTERONE: 4.6 NG/DL

## 2021-02-05 LAB
RENIN ACTIVITY: 7 NG/ML/HR
RENIN COMMENT: NORMAL

## 2021-02-15 DIAGNOSIS — I10 ESSENTIAL HYPERTENSION: ICD-10-CM

## 2021-02-15 RX ORDER — LISINOPRIL 40 MG/1
40 TABLET ORAL DAILY
Qty: 90 TABLET | Refills: 1 | Status: SHIPPED | OUTPATIENT
Start: 2021-02-15 | End: 2021-06-17 | Stop reason: SDUPTHER

## 2021-02-15 RX ORDER — METOPROLOL SUCCINATE 100 MG/1
100 TABLET, EXTENDED RELEASE ORAL DAILY
Qty: 90 TABLET | Refills: 1 | Status: SHIPPED | OUTPATIENT
Start: 2021-02-15 | End: 2021-06-17 | Stop reason: SDUPTHER

## 2021-06-17 ENCOUNTER — OFFICE VISIT (OUTPATIENT)
Dept: FAMILY MEDICINE CLINIC | Age: 56
End: 2021-06-17
Payer: COMMERCIAL

## 2021-06-17 VITALS
TEMPERATURE: 98.4 F | DIASTOLIC BLOOD PRESSURE: 84 MMHG | WEIGHT: 206 LBS | SYSTOLIC BLOOD PRESSURE: 156 MMHG | BODY MASS INDEX: 27.94 KG/M2 | OXYGEN SATURATION: 100 % | HEART RATE: 88 BPM

## 2021-06-17 DIAGNOSIS — I10 ESSENTIAL HYPERTENSION: Primary | ICD-10-CM

## 2021-06-17 DIAGNOSIS — Z13.220 SCREENING FOR HYPERLIPIDEMIA: ICD-10-CM

## 2021-06-17 DIAGNOSIS — Z23 NEED FOR VACCINATION: ICD-10-CM

## 2021-06-17 DIAGNOSIS — Z12.11 SCREENING FOR COLON CANCER: ICD-10-CM

## 2021-06-17 DIAGNOSIS — Z76.0 MEDICATION REFILL: ICD-10-CM

## 2021-06-17 PROCEDURE — 99214 OFFICE O/P EST MOD 30 MIN: CPT | Performed by: STUDENT IN AN ORGANIZED HEALTH CARE EDUCATION/TRAINING PROGRAM

## 2021-06-17 RX ORDER — METOPROLOL SUCCINATE 100 MG/1
100 TABLET, EXTENDED RELEASE ORAL DAILY
Qty: 90 TABLET | Refills: 1 | Status: SHIPPED | OUTPATIENT
Start: 2021-06-17 | End: 2021-12-07

## 2021-06-17 RX ORDER — LISINOPRIL 40 MG/1
40 TABLET ORAL DAILY
Qty: 90 TABLET | Refills: 1 | Status: SHIPPED | OUTPATIENT
Start: 2021-06-17 | End: 2021-12-17 | Stop reason: SDUPTHER

## 2021-06-17 SDOH — ECONOMIC STABILITY: FOOD INSECURITY: WITHIN THE PAST 12 MONTHS, YOU WORRIED THAT YOUR FOOD WOULD RUN OUT BEFORE YOU GOT MONEY TO BUY MORE.: NEVER TRUE

## 2021-06-17 SDOH — ECONOMIC STABILITY: FOOD INSECURITY: WITHIN THE PAST 12 MONTHS, THE FOOD YOU BOUGHT JUST DIDN'T LAST AND YOU DIDN'T HAVE MONEY TO GET MORE.: NEVER TRUE

## 2021-06-17 ASSESSMENT — ENCOUNTER SYMPTOMS
NAUSEA: 0
ABDOMINAL PAIN: 0
COUGH: 0
VOMITING: 0
SORE THROAT: 0
SINUS PAIN: 0
WHEEZING: 0
DIARRHEA: 0
BACK PAIN: 0

## 2021-06-17 ASSESSMENT — PATIENT HEALTH QUESTIONNAIRE - PHQ9
SUM OF ALL RESPONSES TO PHQ QUESTIONS 1-9: 0
2. FEELING DOWN, DEPRESSED OR HOPELESS: 0
SUM OF ALL RESPONSES TO PHQ9 QUESTIONS 1 & 2: 0
SUM OF ALL RESPONSES TO PHQ QUESTIONS 1-9: 0
1. LITTLE INTEREST OR PLEASURE IN DOING THINGS: 0
SUM OF ALL RESPONSES TO PHQ QUESTIONS 1-9: 0

## 2021-06-17 ASSESSMENT — SOCIAL DETERMINANTS OF HEALTH (SDOH): HOW HARD IS IT FOR YOU TO PAY FOR THE VERY BASICS LIKE FOOD, HOUSING, MEDICAL CARE, AND HEATING?: NOT HARD AT ALL

## 2021-06-17 NOTE — PATIENT INSTRUCTIONS
SURVEY:    You may be receiving a survey from [employer] regarding your visit today. Please complete the survey to enable us to provide the highest quality of care to you and your family. If you cannot score us a very good on any question, please call the office to discuss how we could of made your experience a very good one. Thank you.       Clinical Care Team:     Dr. Lodema Barthel, [facility]      ClericalTeam:     Mayte Marquez     [facility] [address]

## 2021-06-17 NOTE — PROGRESS NOTES
HPI Notes    Name: Whitney Martínez  : 1965         Chief Complaint:     Chief Complaint   Patient presents with    Hypertension     Patient is here for followup on HTN. He is currently taking Metoprolol and Lisinopril, He states he is doing good.  Medication Refill       History of Present Illness:      HPI    This is a 71-year-old man presenting for interval checkup and evaluation of his sole chronic medical problem, essential hypertension. Essential hypertension: Currently managed with metoprolol succinate 100 mg extended release p.o. daily, as well as lisinopril 40 mg p.o. daily. He takes his medication at approximately 1500 hrs. each day after returning home from work. He has not taken his medication yet today. He reports his blood pressure is usually around 140/80--85mmHg which is inconsistently measured at home. He does not report any side effects. Preventative: We discussed age-appropriate screening methods as recommended by [place] preventive services task force. He is low risk for exposure to hepatitis C, HIV and declines screening for either of these viruses. He is interested in screening for hyperlipidemia with a lipid panel and colon cancer with Cologuard. He states he is interested in receiving Tdap and shingles vaccination, but would like to wait an additional several weeks since he has recently received the Linares Peter COVID-19 vaccine.     Past Medical History:     Past Medical History:   Diagnosis Date    Chronic back pain       Reviewed all health maintenance requirements and ordered appropriate tests  Health Maintenance Due   Topic Date Due    DTaP/Tdap/Td vaccine (1 - Tdap) Never done    Lipid screen  Never done    Shingles Vaccine (1 of 2) Never done    Colon Cancer Screen FIT/FOBT  2021       Past Surgical History:     Past Surgical History:   Procedure Laterality Date    HERNIA REPAIR          Medications:       Prior to Admission medications    Medication Sig Start Date End Date Taking? Authorizing Provider   lisinopril (PRINIVIL;ZESTRIL) 40 MG tablet Take 1 tablet by mouth daily 6/17/21  Yes Joe Delay, DO   metoprolol succinate (TOPROL XL) 100 MG extended release tablet Take 1 tablet by mouth daily 6/17/21  Yes Joe Delay, DO   Blood Pressure KIT 1 Dose by Does not apply route daily 9/17/20  Yes QING Ford CNP        Allergies:       Patient has no known allergies. Social History:     Tobacco:    reports that he has been smoking cigarettes. He has a 25.00 pack-year smoking history. He has never used smokeless tobacco.  Alcohol:      reports current alcohol use. Drug Use:  has no history on file for drug use. Family History:     No family history on file. Review of Systems:         Review of Systems   Constitutional: Negative for fever. HENT: Negative for sinus pain, sneezing and sore throat. Respiratory: Negative for cough and wheezing. Cardiovascular: Negative for chest pain. Gastrointestinal: Negative for abdominal pain, diarrhea, nausea and vomiting. Genitourinary: Negative for difficulty urinating, discharge and dysuria. Musculoskeletal: Negative for back pain. Skin: Negative for rash. Hematological: Negative for adenopathy. Psychiatric/Behavioral: Negative for sleep disturbance. Physical Exam:     Vitals:  BP (!) 156/84 (Site: Right Upper Arm)   Pulse 88   Temp 98.4 °F (36.9 °C) (Temporal)   Wt 206 lb (93.4 kg)   SpO2 100%   BMI 27.94 kg/m²       Physical Exam  Vitals and nursing note reviewed. Constitutional:       General: He is not in acute distress. Appearance: Normal appearance. He is normal weight. HENT:      Right Ear: Tympanic membrane, ear canal and external ear normal. There is no impacted cerumen. Left Ear: Tympanic membrane, ear canal and external ear normal. There is no impacted cerumen. Nose: Nose normal. No congestion.       Mouth/Throat:      Mouth: Mucous Future     Standing Expiration Date:   6/17/2022    Lipid Panel     Standing Status:   Future     Standing Expiration Date:   6/17/2022     Order Specific Question:   Is Patient Fasting?/# of Hours     Answer:   10         Patient Instructions     SURVEY:    You may be receiving a survey from [facility] regarding your visit today. Please complete the survey to enable us to provide the highest quality of care to you and your family. If you cannot score us a very good on any question, please call the office to discuss how we could of made your experience a very good one. Thank you. Clinical Care Team:     Dr. Cristina Whitmore, [facility]      ClericalTeam:     [facility]        Electronically signed by Victor M Ferraro DO on 6/17/2021 at 3:31 PM           Completed Refills   Requested Prescriptions     Signed Prescriptions Disp Refills    lisinopril (PRINIVIL;ZESTRIL) 40 MG tablet 90 tablet 1     Sig: Take 1 tablet by mouth daily    metoprolol succinate (TOPROL XL) 100 MG extended release tablet 90 tablet 1     Sig: Take 1 tablet by mouth daily         Luis Pennington received counseling on the following healthy behaviors: nutrition, exercise and medication adherence  Reviewed prior labs and health maintenance. Continue current medications, diet and exercise. Discussed use, benefit, and side effects of prescribed medications. Barriers to medication compliance addressed. Patient given educational materials - see patient instructions. All patient questions answered. Patient voiced understanding.

## 2021-07-27 DIAGNOSIS — Z12.11 SCREENING FOR COLON CANCER: ICD-10-CM

## 2021-12-07 DIAGNOSIS — Z76.0 MEDICATION REFILL: ICD-10-CM

## 2021-12-07 DIAGNOSIS — I10 ESSENTIAL HYPERTENSION: ICD-10-CM

## 2021-12-07 RX ORDER — METOPROLOL SUCCINATE 100 MG/1
TABLET, EXTENDED RELEASE ORAL
Qty: 90 TABLET | Refills: 1 | Status: SHIPPED | OUTPATIENT
Start: 2021-12-07 | End: 2022-07-25 | Stop reason: SDUPTHER

## 2021-12-17 ENCOUNTER — OFFICE VISIT (OUTPATIENT)
Dept: FAMILY MEDICINE CLINIC | Age: 56
End: 2021-12-17
Payer: COMMERCIAL

## 2021-12-17 VITALS
HEART RATE: 72 BPM | OXYGEN SATURATION: 100 % | HEIGHT: 72 IN | RESPIRATION RATE: 20 BRPM | WEIGHT: 206.4 LBS | DIASTOLIC BLOOD PRESSURE: 76 MMHG | SYSTOLIC BLOOD PRESSURE: 160 MMHG | BODY MASS INDEX: 27.96 KG/M2

## 2021-12-17 DIAGNOSIS — F17.210 CIGARETTE NICOTINE DEPENDENCE WITHOUT COMPLICATION: ICD-10-CM

## 2021-12-17 DIAGNOSIS — I10 ESSENTIAL HYPERTENSION: Primary | ICD-10-CM

## 2021-12-17 DIAGNOSIS — Z13.220 SCREENING FOR HYPERLIPIDEMIA: ICD-10-CM

## 2021-12-17 DIAGNOSIS — R19.5 POSITIVE COLORECTAL CANCER SCREENING USING COLOGUARD TEST: ICD-10-CM

## 2021-12-17 DIAGNOSIS — Z76.0 MEDICATION REFILL: ICD-10-CM

## 2021-12-17 PROCEDURE — 99214 OFFICE O/P EST MOD 30 MIN: CPT | Performed by: STUDENT IN AN ORGANIZED HEALTH CARE EDUCATION/TRAINING PROGRAM

## 2021-12-17 RX ORDER — LISINOPRIL 40 MG/1
40 TABLET ORAL DAILY
Qty: 90 TABLET | Refills: 1 | Status: SHIPPED | OUTPATIENT
Start: 2021-12-17 | End: 2022-07-25 | Stop reason: SDUPTHER

## 2021-12-17 ASSESSMENT — ENCOUNTER SYMPTOMS
ABDOMINAL PAIN: 0
SORE THROAT: 0
WHEEZING: 0
SINUS PAIN: 0
DIARRHEA: 0
VOMITING: 0
COUGH: 0
BACK PAIN: 0
NAUSEA: 0

## 2021-12-17 NOTE — PROGRESS NOTES
HPI Notes    Name: Sheryl Leonard  : 1965         Chief Complaint:     Chief Complaint   Patient presents with    Hypertension       History of Present Illness:      HPI    This is a 56-year-old man with medical history significant for essential hypertension, currently utilizing lisinopril 40 mg p.o. daily and Toprol- mg p.o. daily for this problem. He states he is adherent to both of these medications and does not endorse any new side effects, adverse effects from these medications. He is requiring a refill of his lisinopril today. He does check his pressure at home, but systolic values are no higher than about 148 mmHg, by his recollection. Nicotine dependence: Still smoking 1 pack cigarettes per day, precontemplative regarding cessation. He is not interested in low-dose lung cancer screening due to worry of excessive cost to him. Positive cologuard result: This was resulted in July of this calendar year. He refuses to undergo diagnostic colon scope. When I questioned him on his hesitation toward further work-up, he stated \"if I am and I, I will die. \"    Screening for hyperlipidemia: he reports he will get this done ASAP. Past Medical History:     Past Medical History:   Diagnosis Date    Chronic back pain       Reviewed all health maintenance requirements and ordered appropriate tests  Health Maintenance Due   Topic Date Due    DTaP/Tdap/Td vaccine (1 - Tdap) Never done    Lipid screen  Never done    Shingles Vaccine (1 of 2) Never done    Low dose CT lung screening  Never done    Flu vaccine (1) Never done    COVID-19 Vaccine (3 - Booster for Linares Peter series) 10/20/2021       Past Surgical History:     Past Surgical History:   Procedure Laterality Date    HERNIA REPAIR          Medications:       Prior to Admission medications    Medication Sig Start Date End Date Taking?  Authorizing Provider   lisinopril (PRINIVIL;ZESTRIL) 40 MG tablet Take 1 tablet by mouth daily 21 Yes Surya Mcgill DO   metoprolol succinate (TOPROL XL) 100 MG extended release tablet TAKE 1 TABLET BY MOUTH EVERY DAY 12/7/21  Yes Surya Mcgill DO   Blood Pressure KIT 1 Dose by Does not apply route daily 9/17/20   QING Murphy - CNP        Allergies:       Patient has no known allergies. Social History:     Tobacco:    reports that he has been smoking cigarettes. He has a 25.00 pack-year smoking history. He has never used smokeless tobacco.  Alcohol:      reports current alcohol use. Drug Use:  has no history on file for drug use. Family History:     No family history on file. Review of Systems:         Review of Systems   Constitutional: Negative for fever. HENT: Negative for sinus pain, sneezing and sore throat. Respiratory: Negative for cough and wheezing. Cardiovascular: Negative for chest pain. Gastrointestinal: Negative for abdominal pain, diarrhea, nausea and vomiting. Genitourinary: Negative for difficulty urinating, dysuria and penile discharge. Musculoskeletal: Negative for back pain. Skin: Negative for rash. Hematological: Negative for adenopathy. Psychiatric/Behavioral: Negative for sleep disturbance. Physical Exam:     Vitals:  BP (!) 158/74 (Site: Left Upper Arm, Position: Sitting, Cuff Size: Medium Adult)   Pulse 72   Resp 20   Ht 6' (1.829 m)   Wt 206 lb 6.4 oz (93.6 kg)   SpO2 100%   BMI 27.99 kg/m²       Physical Exam  Vitals and nursing note reviewed. Constitutional:       General: He is not in acute distress. Appearance: Normal appearance. He is normal weight. HENT:      Right Ear: Tympanic membrane, ear canal and external ear normal. There is no impacted cerumen. Left Ear: Tympanic membrane, ear canal and external ear normal. There is no impacted cerumen. Nose: Nose normal. No congestion. Mouth/Throat:      Mouth: Mucous membranes are moist.      Pharynx: Oropharynx is clear. No oropharyngeal exudate. Eyes:      General: No scleral icterus. Conjunctiva/sclera: Conjunctivae normal.      Pupils: Pupils are equal, round, and reactive to light. Cardiovascular:      Rate and Rhythm: Normal rate and regular rhythm. Pulses: Normal pulses. Heart sounds: Normal heart sounds. No murmur heard. Pulmonary:      Effort: Pulmonary effort is normal. No respiratory distress. Breath sounds: Normal breath sounds. No wheezing. Abdominal:      General: Abdomen is flat. Bowel sounds are normal.      Palpations: Abdomen is soft. Tenderness: There is no abdominal tenderness. Musculoskeletal:         General: Normal range of motion. Cervical back: Normal range of motion and neck supple. No rigidity. No muscular tenderness. Lymphadenopathy:      Cervical: No cervical adenopathy. Skin:     General: Skin is warm and dry. Capillary Refill: Capillary refill takes less than 2 seconds. Neurological:      General: No focal deficit present. Mental Status: He is alert and oriented to person, place, and time. Mental status is at baseline. Psychiatric:         Mood and Affect: Mood normal.         Behavior: Behavior normal.         Thought Content: Thought content normal.                 Data:     Lab Results   Component Value Date     01/29/2021    K 4.0 01/29/2021     01/29/2021    CO2 25 01/29/2021    BUN 12 01/29/2021    CREATININE 0.93 01/29/2021    GLUCOSE 91 01/29/2021    PROT 7.1 01/29/2021    LABALBU 4.4 01/29/2021    BILITOT 0.18 01/29/2021    ALKPHOS 81 01/29/2021    AST 31 01/29/2021    ALT 43 01/29/2021     No results found for: WBC, RBC, HGB, HCT, MCV, MCH, MCHC, RDW, PLT, MPV  No results found for: TSH  No results found for: CHOL, HDL, PSA, LABA1C       Assessment & Plan        Diagnosis Orders   1. Essential hypertension  lisinopril (PRINIVIL;ZESTRIL) 40 MG tablet   2. Positive colorectal cancer screening using Cologuard test     3.  Cigarette nicotine dependence without complication     4. Medication refill  lisinopril (PRINIVIL;ZESTRIL) 40 MG tablet   5. Screening for hyperlipidemia         1. Continue lisinopril, Toprol-XL, will refill lisinopril. 2.  I strongly advocated that he complete a diagnostic colon scope, as a positive Cologuard result could certainly be indicative of early stage colon cancer. He refuses. He states that the idea of working it up \"is out of my comfort zone, I am in introvert, I do not like seeing other people and this is just too different than what I normally want to do and what my routine is. \"    I expressed that I did not believe that this is a beneficial decision he is making, but will respect his autonomy. He is welcome to contact me if he changes his mind about referral.    3.  Not ready to quit at this time, will continue to advocate for a tobacco free lifestyle. 5.  We will reorder lipid panel, he can get this done anytime. Follow-up in 6 months. Completed Refills   Requested Prescriptions     Signed Prescriptions Disp Refills    lisinopril (PRINIVIL;ZESTRIL) 40 MG tablet 90 tablet 1     Sig: Take 1 tablet by mouth daily     Return in about 6 months (around 6/17/2022) for Well Visit-non medicare. Orders Placed This Encounter   Medications    lisinopril (PRINIVIL;ZESTRIL) 40 MG tablet     Sig: Take 1 tablet by mouth daily     Dispense:  90 tablet     Refill:  1     No orders of the defined types were placed in this encounter. Patient Instructions   Nayeli Lozano,    Call your insurance company and ask what the out of pocket cost for once a year low dose chest CT screening for lung cancer would be. This is a good idea to do once a year since you're a candidate for it and still smoking. SURVEY:    You may be receiving a survey from [employer] regarding your visit today. Please complete the survey to enable us to provide the highest quality of care to you and your family.     If you cannot score us a very good on any question, please call the office to discuss how we could of made your experience a very good one. Thank you. Clinical Care Team:     Dr. Castro Friday, RMPEPE      ClericalTeam:     Katie Grimaldo      Electronically signed by Víctor Casas DO on 12/17/2021 at 3:33 PM           Completed Refills   Requested Prescriptions     Signed Prescriptions Disp Refills    lisinopril (PRINIVIL;ZESTRIL) 40 MG tablet 90 tablet 1     Sig: Take 1 tablet by mouth daily         Irlanda Host received counseling on the following healthy behaviors: nutrition, exercise and medication adherence  Reviewed prior labs and health maintenance. Continue current medications, diet and exercise. Discussed use, benefit, and side effects of prescribed medications. Barriers to medication compliance addressed. Patient given educational materials - see patient instructions. All patient questions answered. Patient voiced understanding.

## 2021-12-17 NOTE — PATIENT INSTRUCTIONS
Maria Esther Kinsey,    Call your insurance company and ask what the out of pocket cost for once a year low dose chest CT screening for lung cancer would be. This is a good idea to do once a year since you're a candidate for it and still smoking. SURVEY:    You may be receiving a survey from [employer] regarding your visit today. Please complete the survey to enable us to provide the highest quality of care to you and your family. If you cannot score us a very good on any question, please call the office to discuss how we could of made your experience a very good one. Thank you.       Clinical Care Team:     Dr. Roman Munoz, PEPE      ClericalTeam:     [id number] [facility]

## 2022-06-17 ENCOUNTER — OFFICE VISIT (OUTPATIENT)
Dept: FAMILY MEDICINE CLINIC | Age: 57
End: 2022-06-17
Payer: COMMERCIAL

## 2022-06-17 VITALS
SYSTOLIC BLOOD PRESSURE: 142 MMHG | WEIGHT: 200 LBS | DIASTOLIC BLOOD PRESSURE: 74 MMHG | HEART RATE: 60 BPM | HEIGHT: 72 IN | RESPIRATION RATE: 20 BRPM | OXYGEN SATURATION: 96 % | BODY MASS INDEX: 27.09 KG/M2

## 2022-06-17 DIAGNOSIS — Z12.2 ENCOUNTER FOR SCREENING FOR MALIGNANT NEOPLASM OF LUNG: ICD-10-CM

## 2022-06-17 DIAGNOSIS — F17.210 CIGARETTE NICOTINE DEPENDENCE WITHOUT COMPLICATION: ICD-10-CM

## 2022-06-17 DIAGNOSIS — Z13.31 SCREENING FOR DEPRESSION: ICD-10-CM

## 2022-06-17 DIAGNOSIS — Z00.00 GENERAL MEDICAL EXAMINATION: Primary | ICD-10-CM

## 2022-06-17 DIAGNOSIS — I10 ESSENTIAL HYPERTENSION: ICD-10-CM

## 2022-06-17 DIAGNOSIS — Z12.5 SCREENING FOR PROSTATE CANCER: ICD-10-CM

## 2022-06-17 DIAGNOSIS — Z13.220 SCREENING FOR HYPERLIPIDEMIA: ICD-10-CM

## 2022-06-17 DIAGNOSIS — R19.5 POSITIVE COLORECTAL CANCER SCREENING USING COLOGUARD TEST: ICD-10-CM

## 2022-06-17 PROCEDURE — 99396 PREV VISIT EST AGE 40-64: CPT | Performed by: STUDENT IN AN ORGANIZED HEALTH CARE EDUCATION/TRAINING PROGRAM

## 2022-06-17 ASSESSMENT — ENCOUNTER SYMPTOMS
SORE THROAT: 0
DIARRHEA: 0
VOMITING: 0
SINUS PAIN: 0
ABDOMINAL PAIN: 0
BACK PAIN: 0
WHEEZING: 0
COUGH: 0
NAUSEA: 0

## 2022-06-17 ASSESSMENT — PATIENT HEALTH QUESTIONNAIRE - PHQ9
SUM OF ALL RESPONSES TO PHQ QUESTIONS 1-9: 0
2. FEELING DOWN, DEPRESSED OR HOPELESS: 0
1. LITTLE INTEREST OR PLEASURE IN DOING THINGS: 0
SUM OF ALL RESPONSES TO PHQ QUESTIONS 1-9: 0
SUM OF ALL RESPONSES TO PHQ9 QUESTIONS 1 & 2: 0

## 2022-06-17 NOTE — PROGRESS NOTES
HPI Notes    Name: Fernandez Clifton  : 1965         Chief Complaint:     Chief Complaint   Patient presents with    Annual Exam    Hypertension     states he has been taking his BP at different times feels it starts to wear off earlier than it has before        History of Present Illness:      HPI    This is a 65year old man presenting for an annual well visit. HTN: he has noticed that his BP has been somewhat elevated, and has been less well controlled over the past several weeks, he is reporting his BP elevates within 4 hours of needing to take his lisinopril and Toprol XL. He is using both at the same time and otherwise feels well. Nicotine dependence: still 1 PPD smoking, pre-contemplative regarding cessation. Positive Cologuard: patient now interested in referral for diagnostic colon scope. Will place referral.     Preventative: He would like to complete screening for lung cancer, prostate cancer, hyperlipidemia once he has completed his diagnostic colon scope. Past Medical History:     Past Medical History:   Diagnosis Date    Chronic back pain       Reviewed all health maintenance requirements and ordered appropriate tests  Health Maintenance Due   Topic Date Due    Pneumococcal 0-64 years Vaccine (1 - PCV) Never done    DTaP/Tdap/Td vaccine (1 - Tdap) Never done    Lipids  Never done    Prostate Specific Antigen (PSA) Screening or Monitoring  Never done    Shingles vaccine (1 of 2) Never done    Low dose CT lung screening  Never done    COVID-19 Vaccine (3 - Booster for [employer] series) 2021    Colorectal Cancer Screen  2022       Past Surgical History:     Past Surgical History:   Procedure Laterality Date    HERNIA REPAIR          Medications:       Prior to Admission medications    Medication Sig Start Date End Date Taking?  Authorizing Provider   lisinopril (PRINIVIL;ZESTRIL) 40 MG tablet Take 1 tablet by mouth daily 21  Yes Mia Watt, DO metoprolol succinate (TOPROL XL) 100 MG extended release tablet TAKE 1 TABLET BY MOUTH EVERY DAY 12/7/21  Yes Wu Thompson, DO   Blood Pressure KIT 1 Dose by Does not apply route daily 9/17/20   QING Pennington CNP        Allergies:       Patient has no known allergies. Social History:     Tobacco:    reports that he has been smoking cigarettes. He has a 25.00 pack-year smoking history. He has never used smokeless tobacco.  Alcohol:      reports current alcohol use. Drug Use:  has no history on file for drug use. Family History:     No family history on file. Review of Systems:       Review of Systems   Constitutional: Negative for fever. HENT: Negative for sinus pain, sneezing and sore throat. Respiratory: Negative for cough and wheezing. Cardiovascular: Negative for chest pain. Gastrointestinal: Negative for abdominal pain, diarrhea, nausea and vomiting. Musculoskeletal: Negative for back pain. Skin: Negative for rash. Hematological: Negative for adenopathy. Psychiatric/Behavioral: Negative for sleep disturbance. Physical Exam:     Vitals:  BP (!) 142/74 (Site: Left Upper Arm, Position: Sitting, Cuff Size: Medium Adult)   Pulse 60   Resp 20   Ht 6' (1.829 m)   Wt 200 lb (90.7 kg)   SpO2 96%   BMI 27.12 kg/m²       Physical Exam  Vitals and nursing note reviewed. Constitutional:       General: He is not in acute distress. Appearance: Normal appearance. He is normal weight. HENT:      Right Ear: Tympanic membrane, ear canal and external ear normal. There is no impacted cerumen. Left Ear: Tympanic membrane, ear canal and external ear normal. There is no impacted cerumen. Nose: Nose normal. No congestion. Mouth/Throat:      Mouth: Mucous membranes are moist.      Pharynx: Oropharynx is clear. No oropharyngeal exudate. Eyes:      General: No scleral icterus.      Conjunctiva/sclera: Conjunctivae normal.      Pupils: Pupils are equal, round, and reactive to light. Cardiovascular:      Rate and Rhythm: Normal rate and regular rhythm. Pulses: Normal pulses. Heart sounds: Normal heart sounds. No murmur heard. Pulmonary:      Effort: Pulmonary effort is normal. No respiratory distress. Breath sounds: Normal breath sounds. No wheezing. Abdominal:      General: Abdomen is flat. Bowel sounds are normal.      Palpations: Abdomen is soft. Tenderness: There is no abdominal tenderness. Musculoskeletal:         General: No swelling or tenderness. Normal range of motion. Cervical back: Normal range of motion and neck supple. No rigidity. No muscular tenderness. Lymphadenopathy:      Cervical: No cervical adenopathy. Skin:     General: Skin is warm and dry. Capillary Refill: Capillary refill takes less than 2 seconds. Findings: No rash. Neurological:      General: No focal deficit present. Mental Status: He is alert and oriented to person, place, and time. Mental status is at baseline. Psychiatric:         Mood and Affect: Mood normal.         Behavior: Behavior normal.         Thought Content: Thought content normal.                 Data:     Lab Results   Component Value Date     01/29/2021    K 4.0 01/29/2021     01/29/2021    CO2 25 01/29/2021    BUN 12 01/29/2021    CREATININE 0.93 01/29/2021    GLUCOSE 91 01/29/2021    PROT 7.1 01/29/2021    LABALBU 4.4 01/29/2021    BILITOT 0.18 01/29/2021    ALKPHOS 81 01/29/2021    AST 31 01/29/2021    ALT 43 01/29/2021     No results found for: WBC, RBC, HGB, HCT, MCV, MCH, MCHC, RDW, PLT, MPV  No results found for: TSH  No results found for: CHOL, LDL, HDL, PSA, LABA1C       Assessment & Plan        Diagnosis Orders   1. General medical examination     2. Screening for hyperlipidemia  Lipid Panel   3. Encounter for screening for malignant neoplasm of lung     4. Screening for depression     5. Screening for prostate cancer     6.  Essential hypertension     7. Positive colorectal cancer screening using Cologuard test  Marlen Zapata DO, General Surgery, [place]   8. Cigarette nicotine dependence without complication         1. Overall, this is a fairly healthy 31-year-old man. I would recommend that he return in 6 months for an interval medical examination. 2.  We will order lipid panel. 3.  He would like to defer ordering a low-dose CT scan until after his diagnostic colonoscopy is completed. 4.  He is not depressed. 5.  He would like to defer PSA screening until after his diagnostic colonoscopy. 6.  I encouraged him to separate his lisinopril and Toprol-XL at either ends of his working day. He is to measure his blood pressure twice daily at random times and bring the log back to the office in 2 weeks. May recommend medication adjustment at that time. 7.  We will refer to general surgery for diagnostic colon scope. 8.  Precontemplative regarding cessation, will continue to support him as he moves toward a tobacco free lifestyle. Follow-up in 2 weeks for high blood pressure, 6 months for interval medical visit      Completed Refills   Requested Prescriptions      No prescriptions requested or ordered in this encounter     Return in about 2 weeks (around 7/1/2022) for HTN. No orders of the defined types were placed in this encounter.     Orders Placed This Encounter   Procedures    Lipid Panel     Standing Status:   Future     Standing Expiration Date:   6/17/2023     Order Specific Question:   Is Patient Fasting?/# of Hours     Answer:   yes/10   Marlen Padilla DO, General Surgery, [place]     Referral Priority:   Routine     Referral Type:   Eval and Treat     Referral Reason:   Specialty Services Required     Referred to Provider:   Lis Meek DO     Requested Specialty:   General Surgery     Number of Visits Requested:   1         Patient Instructions     SURVEY:    You may be receiving a survey from [employer] regarding your visit today. Please complete the survey to enable us to provide the highest quality of care to you and your family. If you cannot score us a very good on any question, please call the office to discuss how we could of made your experience a very good one. Thank you.       Clinical Care Team:     KYM Ross      ClericalTeam:     India De La Paz        Electronically signed by Juan Luis Ghosh DO on 6/17/2022 at 4:19 PM           Completed Refills   Requested Prescriptions      No prescriptions requested or ordered in this encounter

## 2022-06-17 NOTE — PATIENT INSTRUCTIONS
SURVEY:    You may be receiving a survey from [employer] regarding your visit today. Please complete the survey to enable us to provide the highest quality of care to you and your family. If you cannot score us a very good on any question, please call the office to discuss how we could of made your experience a very good one. Thank you.       Clinical Care Team:     Dr. Harjit Ambrose, PEPE      [facility] Hospital[facility]:     [id number] [facility]

## 2022-06-21 ENCOUNTER — TELEPHONE (OUTPATIENT)
Dept: SURGERY | Age: 57
End: 2022-06-21

## 2022-06-21 DIAGNOSIS — R19.5 POSITIVE COLORECTAL CANCER SCREENING USING COLOGUARD TEST: Primary | ICD-10-CM

## 2022-06-21 NOTE — TELEPHONE ENCOUNTER
Patient informed Dr. Patsy Corey is currently scheduled to the end of the year for colonoscopys. Patient agreeable to see Dr. Kinga Manzo at [facility]. Referral placed.

## 2022-07-01 ENCOUNTER — OFFICE VISIT (OUTPATIENT)
Dept: FAMILY MEDICINE CLINIC | Age: 57
End: 2022-07-01
Payer: COMMERCIAL

## 2022-07-01 VITALS
SYSTOLIC BLOOD PRESSURE: 138 MMHG | WEIGHT: 200.2 LBS | DIASTOLIC BLOOD PRESSURE: 80 MMHG | BODY MASS INDEX: 27.12 KG/M2 | HEART RATE: 74 BPM | RESPIRATION RATE: 20 BRPM | HEIGHT: 72 IN | OXYGEN SATURATION: 98 %

## 2022-07-01 DIAGNOSIS — I10 ESSENTIAL HYPERTENSION: Primary | ICD-10-CM

## 2022-07-01 PROCEDURE — 99213 OFFICE O/P EST LOW 20 MIN: CPT | Performed by: STUDENT IN AN ORGANIZED HEALTH CARE EDUCATION/TRAINING PROGRAM

## 2022-07-01 SDOH — ECONOMIC STABILITY: FOOD INSECURITY: WITHIN THE PAST 12 MONTHS, YOU WORRIED THAT YOUR FOOD WOULD RUN OUT BEFORE YOU GOT MONEY TO BUY MORE.: NEVER TRUE

## 2022-07-01 SDOH — ECONOMIC STABILITY: FOOD INSECURITY: WITHIN THE PAST 12 MONTHS, THE FOOD YOU BOUGHT JUST DIDN'T LAST AND YOU DIDN'T HAVE MONEY TO GET MORE.: NEVER TRUE

## 2022-07-01 ASSESSMENT — ENCOUNTER SYMPTOMS
SORE THROAT: 0
BACK PAIN: 0
VOMITING: 0
COUGH: 0
WHEEZING: 0
NAUSEA: 0
SINUS PAIN: 0
DIARRHEA: 0
ABDOMINAL PAIN: 0

## 2022-07-01 ASSESSMENT — SOCIAL DETERMINANTS OF HEALTH (SDOH): HOW HARD IS IT FOR YOU TO PAY FOR THE VERY BASICS LIKE FOOD, HOUSING, MEDICAL CARE, AND HEATING?: NOT HARD AT ALL

## 2022-07-01 NOTE — PATIENT INSTRUCTIONS
SURVEY:    You may be receiving a survey from [employer] regarding your visit today. Please complete the survey to enable us to provide the highest quality of care to you and your family. If you cannot score us a very good on any question, please call the office to discuss how we could of made your experience a very good one. Thank you.       Clinical Care Team:     Dr. Edwar Lopez, PEPE      ClericalTeam:     [id number] [facility]

## 2022-07-01 NOTE — PROGRESS NOTES
HPI Notes    Name: Vivek Jo  : 1965         Chief Complaint:     Chief Complaint   Patient presents with    Hypertension       History of Present Illness:      HPI    This is a 52-year-old man with essential hypertension presenting to follow-up for the same. He is switched to utilizing his lisinopril and Toprol-XL at separate times. While he did not bring a log book, he has been noting BP of around 135/80 mmHg most times he has measured (qD--BID). Past Medical History:     Past Medical History:   Diagnosis Date    Chronic back pain       Reviewed all health maintenance requirements and ordered appropriate tests  Health Maintenance Due   Topic Date Due    Pneumococcal 0-64 years Vaccine (1 - PCV) Never done    DTaP/Tdap/Td vaccine (1 - Tdap) Never done    Lipids  Never done    Prostate Specific Antigen (PSA) Screening or Monitoring  Never done    Shingles vaccine (1 of 2) Never done    Low dose CT lung screening  Never done    COVID-19 Vaccine (3 - Booster for Pfizer series) 2021    Colorectal Cancer Screen  2022       Past Surgical History:     Past Surgical History:   Procedure Laterality Date    HERNIA REPAIR          Medications:       Prior to Admission medications    Medication Sig Start Date End Date Taking? Authorizing Provider   lisinopril (PRINIVIL;ZESTRIL) 40 MG tablet Take 1 tablet by mouth daily 21  Yes Jessica Lopez DO   metoprolol succinate (TOPROL XL) 100 MG extended release tablet TAKE 1 TABLET BY MOUTH EVERY DAY 21  Yes Jessica Lopez DO   Blood Pressure KIT 1 Dose by Does not apply route daily 20   QING Middleton CNP        Allergies:       Patient has no known allergies. Social History:     Tobacco:    reports that he has been smoking cigarettes. He has a 25.00 pack-year smoking history. He has never used smokeless tobacco.  Alcohol:      reports current alcohol use.   Drug Use:  has no history on file for drug use.    Family History:     No family history on file. Review of Systems:     Review of Systems   Constitutional: Negative for fever. HENT: Negative for sinus pain, sneezing and sore throat. Respiratory: Negative for cough and wheezing. Cardiovascular: Negative for chest pain. Gastrointestinal: Negative for abdominal pain, diarrhea, nausea and vomiting. Musculoskeletal: Negative for back pain. Skin: Negative for rash. Hematological: Negative for adenopathy. Psychiatric/Behavioral: Negative for sleep disturbance. Physical Exam:     Vitals:  /80 (Site: Left Upper Arm, Position: Sitting, Cuff Size: Medium Adult)   Pulse 74   Resp 20   Ht 6' (1.829 m)   Wt 200 lb 3.2 oz (90.8 kg)   SpO2 98%   BMI 27.15 kg/m²       Physical Exam  Vitals and nursing note reviewed. Constitutional:       General: He is not in acute distress. Appearance: Normal appearance. He is normal weight. Cardiovascular:      Rate and Rhythm: Normal rate and regular rhythm. Pulses: Normal pulses. Heart sounds: Normal heart sounds. No murmur heard. Pulmonary:      Effort: Pulmonary effort is normal. No respiratory distress. Breath sounds: Normal breath sounds. No wheezing or rhonchi. Musculoskeletal:         General: No swelling or tenderness. Normal range of motion. Skin:     General: Skin is warm and dry. Capillary Refill: Capillary refill takes less than 2 seconds. Neurological:      General: No focal deficit present. Mental Status: He is alert and oriented to person, place, and time. Mental status is at baseline. Psychiatric:         Mood and Affect: Mood normal.         Behavior: Behavior normal.         Thought Content:  Thought content normal.                 Data:     Lab Results   Component Value Date/Time     01/29/2021 11:37 AM    K 4.0 01/29/2021 11:37 AM     01/29/2021 11:37 AM    CO2 25 01/29/2021 11:37 AM    BUN 12 01/29/2021 11:37 AM CREATININE 0.93 01/29/2021 11:37 AM    GLUCOSE 91 01/29/2021 11:37 AM    PROT 7.1 01/29/2021 11:37 AM    LABALBU 4.4 01/29/2021 11:37 AM    BILITOT 0.18 01/29/2021 11:37 AM    ALKPHOS 81 01/29/2021 11:37 AM    AST 31 01/29/2021 11:37 AM    ALT 43 01/29/2021 11:37 AM     No results found for: WBC, RBC, HGB, HCT, MCV, MCH, MCHC, RDW, PLT, MPV  No results found for: TSH  No results found for: CHOL, LDL, HDL, PSA, LABA1C       Assessment & Plan      Diagnosis Orders   1. Essential hypertension       1. Overall, he seems to be responding well to the timing change for his antihypertensives. I would recommend to continue current therapy and make no changes to his medical therapy. Follow in 6 months for non-medicare AWV          Completed Refills   Requested Prescriptions      No prescriptions requested or ordered in this encounter     No follow-ups on file. No orders of the defined types were placed in this encounter. No orders of the defined types were placed in this encounter. Patient Instructions     SURVEY:    You may be receiving a survey from [employer] regarding your visit today. Please complete the survey to enable us to provide the highest quality of care to you and your family. If you cannot score us a very good on any question, please call the office to discuss how we could of made your experience a very good one. Thank you.       Clinical Care Team:     Dr. Katia Whaley PEPE      ClericalTeam:     Leroy Horn      Electronically signed by Alec Walker DO on 7/1/2022 at 3:50 PM           Completed Refills   Requested Prescriptions      No prescriptions requested or ordered in this encounter

## 2022-07-24 DIAGNOSIS — Z76.0 MEDICATION REFILL: ICD-10-CM

## 2022-07-24 DIAGNOSIS — I10 ESSENTIAL HYPERTENSION: ICD-10-CM

## 2022-07-25 RX ORDER — LISINOPRIL 40 MG/1
TABLET ORAL
Qty: 90 TABLET | Refills: 1 | Status: SHIPPED | OUTPATIENT
Start: 2022-07-25

## 2022-07-25 RX ORDER — METOPROLOL SUCCINATE 100 MG/1
TABLET, EXTENDED RELEASE ORAL
Qty: 90 TABLET | Refills: 1 | Status: SHIPPED | OUTPATIENT
Start: 2022-07-25

## 2022-08-25 ENCOUNTER — OFFICE VISIT (OUTPATIENT)
Dept: GASTROENTEROLOGY | Age: 57
End: 2022-08-25
Payer: COMMERCIAL

## 2022-08-25 VITALS
BODY MASS INDEX: 27.12 KG/M2 | RESPIRATION RATE: 18 BRPM | WEIGHT: 200 LBS | SYSTOLIC BLOOD PRESSURE: 126 MMHG | DIASTOLIC BLOOD PRESSURE: 88 MMHG | OXYGEN SATURATION: 97 % | HEART RATE: 70 BPM

## 2022-08-25 DIAGNOSIS — Z12.11 COLON CANCER SCREENING: Primary | ICD-10-CM

## 2022-08-25 PROCEDURE — 99202 OFFICE O/P NEW SF 15 MIN: CPT | Performed by: INTERNAL MEDICINE

## 2022-08-25 RX ORDER — POLYETHYLENE GLYCOL 3350, SODIUM CHLORIDE, SODIUM BICARBONATE, POTASSIUM CHLORIDE 420; 11.2; 5.72; 1.48 G/4L; G/4L; G/4L; G/4L
4000 POWDER, FOR SOLUTION ORAL ONCE
Qty: 4000 ML | Refills: 0 | Status: SHIPPED | OUTPATIENT
Start: 2022-08-25 | End: 2022-08-25

## 2022-08-25 ASSESSMENT — ENCOUNTER SYMPTOMS: BACK PAIN: 1

## 2022-08-25 NOTE — PROGRESS NOTES
[address]    Chief Complaint   Patient presents with    Colonoscopy     Patient has never had a colonoscopy; he had a positive cologuard. Denies family history of colon cancers. He also denies bowel habit changes in himself. HPI    Mr. Robert Gannon is a 65year old man with a history of hypertension who presents for colon cancer screening. He denies any history of acid reflux. Family history of colon cancer: No  Blood in stool: No  Unintentional weight loss: No  Abdominal pain: No  Prior colonoscopy: No  Constipation history: Number of bowel movements a day: 2-3  Change in stool caliber: Yes        Past Medical History:   Diagnosis Date    Chronic back pain          Past Surgical History:   Procedure Laterality Date    HERNIA REPAIR           Current Outpatient Medications   Medication Sig Dispense Refill    polyethylene glycol-electrolytes (NULYTELY) 420 g solution Take 4,000 mLs by mouth once for 1 dose 4000 mL 0    lisinopril (PRINIVIL;ZESTRIL) 40 MG tablet TAKE 1 TABLET BY MOUTH EVERY DAY 90 tablet 1    metoprolol succinate (TOPROL XL) 100 MG extended release tablet TAKE 1 TABLET BY MOUTH EVERY DAY 90 tablet 1    Blood Pressure KIT 1 Dose by Does not apply route daily 1 kit 0     No current facility-administered medications for this visit. No family history on file.        Social Determinants of Health     Tobacco Use: High Risk    Smoking Tobacco Use: Every Day    Smokeless Tobacco Use: Never   Alcohol Use: Not on file   Financial Resource Strain: Low Risk     Difficulty of Paying Living Expenses: Not hard at all   Food Insecurity: No Food Insecurity    Worried About Running Out of Food in the Last Year: Never true    Ran Out of Food in the Last Year: Never true   Transportation Needs: Not on file   Physical Activity: Not on file   Stress: Not on file   Social Connections: Not on file   Intimate Partner Violence: Not on file   Depression: Not at risk    PHQ-2 Score: 0 Housing Stability: Not on file       Review of Systems   Cardiovascular:         Hypertension   Gastrointestinal:         Hemorrhoids   Musculoskeletal:  Positive for back pain. All other systems reviewed and are negative. /88   Pulse 70   Resp 18   Wt 200 lb (90.7 kg)   SpO2 97%   BMI 27.12 kg/m²     Physical Exam  Constitutional:       Appearance: Normal appearance. HENT:      Head: Normocephalic. Nose: Nose normal.      Mouth/Throat:      Mouth: Mucous membranes are moist.      Comments: Mallampati score 1  Eyes:      Pupils: Pupils are equal, round, and reactive to light. Cardiovascular:      Rate and Rhythm: Normal rate and regular rhythm. Pulses: Normal pulses. Heart sounds: Normal heart sounds. Pulmonary:      Effort: Pulmonary effort is normal.      Breath sounds: Normal breath sounds. Abdominal:      General: Bowel sounds are normal. There is no distension. Palpations: Abdomen is soft. Musculoskeletal:         General: Normal range of motion. Cervical back: Normal range of motion. Skin:     General: Skin is warm. Neurological:      General: No focal deficit present. Mental Status: He is alert. Psychiatric:         Mood and Affect: Mood normal.         No results found for: WBC, HGB, HCT, MCV, PLT     Lab Results   Component Value Date     01/29/2021    K 4.0 01/29/2021     01/29/2021    CO2 25 01/29/2021    BUN 12 01/29/2021    CREATININE 0.93 01/29/2021    GLUCOSE 91 01/29/2021    CALCIUM 9.7 01/29/2021    PROT 7.1 01/29/2021    LABALBU 4.4 01/29/2021    BILITOT 0.18 (L) 01/29/2021    ALKPHOS 81 01/29/2021    AST 31 01/29/2021    ALT 43 (H) 01/29/2021    LABGLOM >60 01/29/2021    GFRAA >60 01/29/2021        No results found for: INR, PROTIME      Assessment    Mr. Mabel Murphy is a 65year old man with a history of hypertension who presents for colon cancer screening. He denies any history of acid reflux. He is average risk.  ASA 2, Mallampati score 2. Plan    1. Colon cancer screening:  - polyethylene glycol-electrolytes (NULYTELY) 420 g solution; Take 4,000 mLs by mouth once for 1 dose  Dispense: 4000 mL; Refill: 0  - COLONOSCOPY (Screening); Future    2. Additional recommendations based on findings. Informed consent was obtained with a discussion about potential risks and complications of the procedure. Patient verbalized understanding and willingness to continue with the procedure scheduling. Spent 20 minutes with the patient with greater than 50 percent of the time was spent on face-to-face time in discussion with the patient regarding diagnostic options/results, treatment options, counseling, and follow-up plan.       Sussy Frey MD

## 2022-08-25 NOTE — PATIENT INSTRUCTIONS
SURVEY:    You may be receiving a survey from [employer] regarding your visit today. Please complete the survey to enable us to provide the highest quality of care to you and your family. If you cannot score us a very good on any question, please call the office to discuss how we could have made your experience a very good one. Thank you.   MD Cris Garcia, MD Hermes Alford, MD Frantz Velarde, MD Neita Dancer, MD Erika Ricketts, Jos eArmando Lloyd Shin, [place], [place]  Ana Centeno, [address] [place] [address]

## 2022-08-25 NOTE — H&P (VIEW-ONLY)
[address]    Chief Complaint   Patient presents with    Colonoscopy     Patient has never had a colonoscopy; he had a positive cologuard. Denies family history of colon cancers. He also denies bowel habit changes in himself. HPI    Mr. Sergio Barrera is a 65year old man with a history of hypertension who presents for colon cancer screening. He denies any history of acid reflux. Family history of colon cancer: No  Blood in stool: No  Unintentional weight loss: No  Abdominal pain: No  Prior colonoscopy: No  Constipation history: Number of bowel movements a day: 2-3  Change in stool caliber: Yes        Past Medical History:   Diagnosis Date    Chronic back pain          Past Surgical History:   Procedure Laterality Date    HERNIA REPAIR           Current Outpatient Medications   Medication Sig Dispense Refill    polyethylene glycol-electrolytes (NULYTELY) 420 g solution Take 4,000 mLs by mouth once for 1 dose 4000 mL 0    lisinopril (PRINIVIL;ZESTRIL) 40 MG tablet TAKE 1 TABLET BY MOUTH EVERY DAY 90 tablet 1    metoprolol succinate (TOPROL XL) 100 MG extended release tablet TAKE 1 TABLET BY MOUTH EVERY DAY 90 tablet 1    Blood Pressure KIT 1 Dose by Does not apply route daily 1 kit 0     No current facility-administered medications for this visit. No family history on file.        Social Determinants of Health     Tobacco Use: High Risk    Smoking Tobacco Use: Every Day    Smokeless Tobacco Use: Never   Alcohol Use: Not on file   Financial Resource Strain: Low Risk     Difficulty of Paying Living Expenses: Not hard at all   Food Insecurity: No Food Insecurity    Worried About Running Out of Food in the Last Year: Never true    Ran Out of Food in the Last Year: Never true   Transportation Needs: Not on file   Physical Activity: Not on file   Stress: Not on file   Social Connections: Not on file   Intimate Partner Violence: Not on file   Depression: Not at risk    PHQ-2 Score: 0 Housing Stability: Not on file       Review of Systems   Cardiovascular:         Hypertension   Gastrointestinal:         Hemorrhoids   Musculoskeletal:  Positive for back pain. All other systems reviewed and are negative. /88   Pulse 70   Resp 18   Wt 200 lb (90.7 kg)   SpO2 97%   BMI 27.12 kg/m²     Physical Exam  Constitutional:       Appearance: Normal appearance. HENT:      Head: Normocephalic. Nose: Nose normal.      Mouth/Throat:      Mouth: Mucous membranes are moist.      Comments: Mallampati score 1  Eyes:      Pupils: Pupils are equal, round, and reactive to light. Cardiovascular:      Rate and Rhythm: Normal rate and regular rhythm. Pulses: Normal pulses. Heart sounds: Normal heart sounds. Pulmonary:      Effort: Pulmonary effort is normal.      Breath sounds: Normal breath sounds. Abdominal:      General: Bowel sounds are normal. There is no distension. Palpations: Abdomen is soft. Musculoskeletal:         General: Normal range of motion. Cervical back: Normal range of motion. Skin:     General: Skin is warm. Neurological:      General: No focal deficit present. Mental Status: He is alert. Psychiatric:         Mood and Affect: Mood normal.         No results found for: WBC, HGB, HCT, MCV, PLT     Lab Results   Component Value Date     01/29/2021    K 4.0 01/29/2021     01/29/2021    CO2 25 01/29/2021    BUN 12 01/29/2021    CREATININE 0.93 01/29/2021    GLUCOSE 91 01/29/2021    CALCIUM 9.7 01/29/2021    PROT 7.1 01/29/2021    LABALBU 4.4 01/29/2021    BILITOT 0.18 (L) 01/29/2021    ALKPHOS 81 01/29/2021    AST 31 01/29/2021    ALT 43 (H) 01/29/2021    LABGLOM >60 01/29/2021    GFRAA >60 01/29/2021        No results found for: INR, PROTIME      Assessment    Mr. Beto Rizo is a 65year old man with a history of hypertension who presents for colon cancer screening. He denies any history of acid reflux. He is average risk.  ASA 2, Mallampati score 2. Plan    1. Colon cancer screening:  - polyethylene glycol-electrolytes (NULYTELY) 420 g solution; Take 4,000 mLs by mouth once for 1 dose  Dispense: 4000 mL; Refill: 0  - COLONOSCOPY (Screening); Future    2. Additional recommendations based on findings. Informed consent was obtained with a discussion about potential risks and complications of the procedure. Patient verbalized understanding and willingness to continue with the procedure scheduling. Spent 20 minutes with the patient with greater than 50 percent of the time was spent on face-to-face time in discussion with the patient regarding diagnostic options/results, treatment options, counseling, and follow-up plan.       Shaun Colón MD

## 2022-09-06 NOTE — PROGRESS NOTES
[facility]   Preadmission Testing    Name: Felice Dillon  : 1965  Patient Phone: [phone] (home)     Procedure: colonoscopy  Date of Procedure: 9/15  Surgeon: Konrad Meyer MD    Ht:  6' (182.9 cm)  Wt: 200 lb (90.7 kg)  Wt method: Stated    Allergies: No Known Allergies             There were no vitals filed for this visit. No LMP for male patient. Do you take blood thinners? [] Yes    [x] No         Instructed to stop blood thinners prior to procedure? [] Yes    [] No      [] N/A   Do you have sleep apnea? [] Yes    [x] No     Do you have acid reflux ? [] Yes    [x] No     Do you have  hiatal hernia? [] Yes    [x] No    Do you ever experience motion sickness? [] Yes    [x] No     Have you had a respiratory infection or sore throat in last 4 weeks before surgery? [] Yes    [x] No     Do you have poorly controlled asthma or COPD? Difficulty with intubation in past? [] Yes    [x] No      [] Yes    [x] No       Do you have a history of angina in the last month or symptomatic arrhythmia? [] Yes    [x] No     Do you have significant central nervous system disease? [] Yes    [x] No     Have you had an EKG, labs, or chest xray in last 12 months? If yes provide copies to anesthesia   [] Yes    [] No       [] Lab    [] EKG    [] CXR     Have you had a stress test?     [] Yes    [x] No    When/where:    Was it normal?    [] Yes    [] No     Do you or your family have a history of Malignant Hyperthermia? [] Yes    [x] No           Do you smoke? [x] Yes    [] No      Please refrain from smoking on the day of surgery. Patient instructed on: [x] NPO Status   [x] Meds to Take  [x] Ride Home  [x]No Jewelry/Contact Lenses/Nail [place]  [x] Prep/Lax/Clear Liquids    [] Chlorhexidene     DOS Patient Needs [] HCG   [] Blood Sugar  [] PT/INR    [] T&S       COVID Vaccinated?  [x] Yes    [] No                     Patient instructed on the pre-operative, intra-operative, and post-operative process? Yes  Medication instructions reviewed with patient?   Yes

## 2022-09-07 ENCOUNTER — ANESTHESIA EVENT (OUTPATIENT)
Dept: ENDOSCOPY | Age: 57
End: 2022-09-07
Payer: COMMERCIAL

## 2022-09-08 ENCOUNTER — HOSPITAL ENCOUNTER (OUTPATIENT)
Age: 57
Discharge: HOME | End: 2022-09-08
Payer: COMMERCIAL

## 2022-09-08 DIAGNOSIS — Z01.818 PRE-OP TESTING: ICD-10-CM

## 2022-09-08 PROCEDURE — 93005 ELECTROCARDIOGRAM TRACING: CPT | Performed by: INTERNAL MEDICINE

## 2022-09-10 LAB
EKG ATRIAL RATE: 77 BPM
EKG P AXIS: 72 DEGREES
EKG P-R INTERVAL: 144 MS
EKG Q-T INTERVAL: 376 MS
EKG QRS DURATION: 102 MS
EKG QTC CALCULATION (BAZETT): 425 MS
EKG R AXIS: 51 DEGREES
EKG T AXIS: 73 DEGREES
EKG VENTRICULAR RATE: 77 BPM

## 2022-09-10 PROCEDURE — 93010 ELECTROCARDIOGRAM REPORT: CPT | Performed by: INTERNAL MEDICINE

## 2022-09-15 ENCOUNTER — ANESTHESIA (OUTPATIENT)
Dept: ENDOSCOPY | Age: 57
End: 2022-09-15
Payer: COMMERCIAL

## 2022-09-15 ENCOUNTER — HOSPITAL ENCOUNTER (OUTPATIENT)
Dept: PREADMISSION TESTING | Age: 57
Setting detail: SPECIMEN
Discharge: HOME | End: 2022-09-15
Payer: COMMERCIAL

## 2022-09-15 ENCOUNTER — HOSPITAL ENCOUNTER (OUTPATIENT)
Age: 57
Setting detail: OUTPATIENT SURGERY
Discharge: HOME | End: 2022-09-15
Attending: INTERNAL MEDICINE | Admitting: INTERNAL MEDICINE
Payer: COMMERCIAL

## 2022-09-15 VITALS
SYSTOLIC BLOOD PRESSURE: 133 MMHG | RESPIRATION RATE: 20 BRPM | HEART RATE: 68 BPM | BODY MASS INDEX: 25.91 KG/M2 | OXYGEN SATURATION: 98 % | DIASTOLIC BLOOD PRESSURE: 88 MMHG | TEMPERATURE: 98.1 F | WEIGHT: 195.5 LBS | HEIGHT: 73 IN

## 2022-09-15 DIAGNOSIS — Z12.11 COLON CANCER SCREENING: ICD-10-CM

## 2022-09-15 DIAGNOSIS — Z01.818 PRE-OP TESTING: Primary | ICD-10-CM

## 2022-09-15 LAB
SARS-COV-2, RAPID: NOT DETECTED
SPECIMEN DESCRIPTION: NORMAL

## 2022-09-15 PROCEDURE — 7100000011 HC PHASE II RECOVERY - ADDTL 15 MIN: Performed by: INTERNAL MEDICINE

## 2022-09-15 PROCEDURE — 3700000000 HC ANESTHESIA ATTENDED CARE: Performed by: INTERNAL MEDICINE

## 2022-09-15 PROCEDURE — 2709999900 HC NON-CHARGEABLE SUPPLY: Performed by: INTERNAL MEDICINE

## 2022-09-15 PROCEDURE — 2720000010 HC SURG SUPPLY STERILE: Performed by: INTERNAL MEDICINE

## 2022-09-15 PROCEDURE — 2500000003 HC RX 250 WO HCPCS: Performed by: NURSE ANESTHETIST, CERTIFIED REGISTERED

## 2022-09-15 PROCEDURE — 7100000010 HC PHASE II RECOVERY - FIRST 15 MIN: Performed by: INTERNAL MEDICINE

## 2022-09-15 PROCEDURE — C9803 HOPD COVID-19 SPEC COLLECT: HCPCS

## 2022-09-15 PROCEDURE — 3700000001 HC ADD 15 MINUTES (ANESTHESIA): Performed by: INTERNAL MEDICINE

## 2022-09-15 PROCEDURE — 45385 COLONOSCOPY W/LESION REMOVAL: CPT | Performed by: INTERNAL MEDICINE

## 2022-09-15 PROCEDURE — 3609010600 HC COLONOSCOPY POLYPECTOMY SNARE/COLD BIOPSY: Performed by: INTERNAL MEDICINE

## 2022-09-15 PROCEDURE — 88305 TISSUE EXAM BY PATHOLOGIST: CPT

## 2022-09-15 PROCEDURE — 87635 SARS-COV-2 COVID-19 AMP PRB: CPT

## 2022-09-15 PROCEDURE — 45380 COLONOSCOPY AND BIOPSY: CPT | Performed by: INTERNAL MEDICINE

## 2022-09-15 PROCEDURE — 2580000003 HC RX 258: Performed by: INTERNAL MEDICINE

## 2022-09-15 PROCEDURE — 6360000002 HC RX W HCPCS: Performed by: NURSE ANESTHETIST, CERTIFIED REGISTERED

## 2022-09-15 RX ORDER — LIDOCAINE HYDROCHLORIDE 10 MG/ML
INJECTION, SOLUTION EPIDURAL; INFILTRATION; INTRACAUDAL; PERINEURAL PRN
Status: DISCONTINUED | OUTPATIENT
Start: 2022-09-15 | End: 2022-09-15 | Stop reason: SDUPTHER

## 2022-09-15 RX ORDER — SODIUM CHLORIDE, SODIUM LACTATE, POTASSIUM CHLORIDE, CALCIUM CHLORIDE 600; 310; 30; 20 MG/100ML; MG/100ML; MG/100ML; MG/100ML
INJECTION, SOLUTION INTRAVENOUS CONTINUOUS
Status: DISCONTINUED | OUTPATIENT
Start: 2022-09-15 | End: 2022-09-15 | Stop reason: HOSPADM

## 2022-09-15 RX ORDER — PROPOFOL 10 MG/ML
INJECTION, EMULSION INTRAVENOUS CONTINUOUS PRN
Status: DISCONTINUED | OUTPATIENT
Start: 2022-09-15 | End: 2022-09-15 | Stop reason: SDUPTHER

## 2022-09-15 RX ORDER — PROPOFOL 10 MG/ML
INJECTION, EMULSION INTRAVENOUS PRN
Status: DISCONTINUED | OUTPATIENT
Start: 2022-09-15 | End: 2022-09-15 | Stop reason: SDUPTHER

## 2022-09-15 RX ADMIN — SODIUM CHLORIDE, POTASSIUM CHLORIDE, SODIUM LACTATE AND CALCIUM CHLORIDE: 600; 310; 30; 20 INJECTION, SOLUTION INTRAVENOUS at 09:20

## 2022-09-15 RX ADMIN — PROPOFOL 50 MG: 10 INJECTION, EMULSION INTRAVENOUS at 11:40

## 2022-09-15 RX ADMIN — LIDOCAINE HYDROCHLORIDE 50 MG: 10 INJECTION, SOLUTION EPIDURAL; INFILTRATION; INTRACAUDAL; PERINEURAL at 11:40

## 2022-09-15 RX ADMIN — LIDOCAINE HYDROCHLORIDE 50 MG: 10 INJECTION, SOLUTION EPIDURAL; INFILTRATION; INTRACAUDAL; PERINEURAL at 11:42

## 2022-09-15 RX ADMIN — PROPOFOL 100 MCG/KG/MIN: 10 INJECTION, EMULSION INTRAVENOUS at 11:40

## 2022-09-15 ASSESSMENT — LIFESTYLE VARIABLES: SMOKING_STATUS: 1

## 2022-09-15 ASSESSMENT — PAIN - FUNCTIONAL ASSESSMENT: PAIN_FUNCTIONAL_ASSESSMENT: NONE - DENIES PAIN

## 2022-09-15 NOTE — PROGRESS NOTES

## 2022-09-15 NOTE — OP NOTE
[facility]    COLONOSCOPY    PROCEDURE DATE: 09/15/22    REFERRING PHYSICIAN: No ref. provider found     PRIMARY CARE PROVIDER: Lul Polk DO    ATTENDING PHYSICIAN: Sybil Isbell MD     HISTORY: Mr. Eri Briseno is a 65 y.o. male who presents to the  endoscopy unit for colonoscopy. The patient's clinical history is remarkable for hypertension. He is currently medically stable and appropriate for the planned procedure. PREOPERATIVE DIAGNOSIS: screening for colon cancer. PROCEDURES:   Transanal Colonoscopy --screening. POSTPROCEDURE DIAGNOSIS:  Colon polyps    MEDICATIONS: MAC per anesthesia     EB: 2 ml       INSTRUMENT: Olympus CF-H190 AL Pediatric flexible Colonoscope. PREPARATION: The nature and character of the procedure as well as risks, benefits, and alternatives were discussed with the patient and informed consent was obtained. Complications were said to include, but were not limited to: medication allergy, medication reaction, cardiovascular and respiratory problems, bleeding, perforation, infection, and/or missed diagnosis. Following arrival in the endoscopy room, the patient was placed in the left lateral decubitus position and final time-out accomplished in the presence of the nursing staff. Baseline vital signs were obtained and reviewed, and IV sedation was subsequently initiated. FINDINGS: Rectal examination demonstrated no significant visible external abnormality and digital palpation was unremarkable. Following adequate conscious sedation the colonoscope was introduced and advanced under direct visualization to the cecum, which was identified by the ileocecal valve and appendiceal orifice. The bowel preparation was felt to be adequate. Cecal intubation time was 1 minutes. Once maximally inserted, the endoscope was withdrawn and the mucosa was carefully inspected. The mucosal exam revealed colon polyps.  12 polyps were removed - 11 sessile, one pedunculated. 6mm sessile polyp removed with cold biopsy forceps from cecum in piecemeal  7mm sessile polyp removed with a cold snare from transverse colon  5mm, 5mm sigmoid polyp removed with cold biopsy forceps from sigmoid colon  1.3cm pedunculated polyp was removed from the rectum with a hot snare, 2 clips were placed  7mm, 7mm sessile polyps were removed with a  hot snare from rectum  Five 4mm sessile polyps were removed with cold biopsy forceps. Retroflexion was performed in the rectum and no internal hemorrhoids were noted. Withdrawal time was 33 minutes. IMPRESSION:   Colon polyps  Mild diverticulosis of sigmoid    RECOMMENDATIONS:   1) Follow up with referring provider, as previously scheduled. 2) Repeat Colonoscopy in in 3-5 years - await pathology       Electronically signed by Al Recinos MD on 9/15/2022 at 12:28 PM       The patient was counseled at length about the risks of marc Covid-19 during their perioperative period and any recovery window from their procedure. The patient was made aware that marc Covid-19  may worsen their prognosis for recovering from their procedure  and lend to a higher morbidity and/or mortality risk. All material risks, benefits, and reasonable alternatives including postponing the procedure were discussed. The patient DOES wish to proceed with the procedure at this time.

## 2022-09-15 NOTE — DISCHARGE INSTRUCTIONS
Discharge Instructions for Colonoscopy     Colonoscopy is a visual exam of the lining of the large intestine, also called the bowel or colon, with a colonoscope. A colonoscope is a flexible tube with a light and a viewing device. It allows the doctor to view the inside of the colon through a tiny video camera. Colonoscopy is performed for many reasons: unexplained anemia , pain, diarrhea , bloody stools, cancer screening, among many other reasons. Complications from a colonoscopy are rare. Some possible serious complications include perforated bowel (which might require surgery) and bleeding (which could require blood transfusion ). Minor complications include bloating, gas, and cramping that can last for 1-2 days after the procedure. Because air is put into your colon during the procedure, it is normal to pass large amounts of air from your rectum. You may not have a bowel movement for 1-3 days after the procedure. What You Will Need    Someone to drive you home after the procedure   Steps to [address] when you get home. Because the sedative will make you drowsy, don't drive, operate machinery, or make important decisions the day of the procedure. Feelings of bloating, gas, or cramping may persist for 24 hours. Diet     Try sips of water first. If tolerated, resume regular diet or the diet recommended by your physician. Do not drink alcohol for 24 hours. Physical Activity     Ask your doctor when you will be able to return to work. Do not drive, operate heavy machinery, or do activities that require coordination or balance for 24 hours. Otherwise, return to your normal routine as soon as you are comfortable to do so, which is usually the next day after the procedure. Medications    When taking medications, it's important to: Take your medication as directednot more, not less, not at a different time.     Do not stop taking them without consulting your healthcare provider. Don't share them with anyone else. Know what effects and side effects to expect, and report them to your healthcare provider. If you are taking more than one drug, even if it is an over-the-counter medication, herb, or dietary supplement, be sure to check with a physician or pharmacist about drug interactions. Plan ahead for refills so you don't run out. Lifestyle Changes     The results of your colonoscopy will determine if any lifestyle changes are necessary. Follow-up    The doctor will usually give you a preliminary report after the medication wears off and you are more alert. The results from a biopsy can take as long as 1-2 weeks to be completed. Schedule a follow-up appointment as directed by your doctor. You should schedule a follow-up colonoscopy as recommended by your doctor. Call Your Doctor If Any of the Following Occurs   Monitor your recovery once you leave the hospital. As soon as you have a problem, alert your doctor. If any of the following occur, call your doctor:    Bleeding from your rectum; notify your doctor if you pass a teaspoonful or more of blood    Black, tarry stools    Severe abdominal pain    Hard, swollen abdomen    Signs of infection, including fever or chills    Inability to pass gas or stool    Coughing, shortness of breath, chest pain, severe nausea or vomiting  In case of an emergency, call 911 immediately.

## 2022-09-15 NOTE — ANESTHESIA POSTPROCEDURE EVALUATION
Department of Anesthesiology  Postprocedure Note    Patient: Jimmie Mayfield  MRN: [id number]  YOB: 1965  Date of evaluation: 9/15/2022      Procedure Summary     Date: 09/15/22 Room / Location: [address] [place] [address] ENDOSCOPY    Anesthesia Start: 9835 Anesthesia Stop: 1219    Procedure: COLONOSCOPY POLYPECTOMY SNARE/COLD BIOPSY (Rectum) Diagnosis:       Colon cancer screening      (Colon cancer screening [Z12.11])    Surgeons: Daniel Ponce MD Responsible Provider: QING Ly CRNA    Anesthesia Type: MAC ASA Status: 2          Anesthesia Type: No value filed.     Thad Phase I: Thad Score: 10    Thad Phase II:        Anesthesia Post Evaluation    Patient location during evaluation: PACU  Patient participation: complete - patient participated  Level of consciousness: awake and lethargic  Pain score: 0  Airway patency: patent  Nausea & Vomiting: no nausea and no vomiting  Complications: no  Cardiovascular status: blood pressure returned to baseline and hemodynamically stable  Respiratory status: acceptable, room air and spontaneous ventilation  Hydration status: euvolemic  Multimodal analgesia pain management approach

## 2022-09-15 NOTE — ANESTHESIA PRE PROCEDURE
Department of Anesthesiology  Preprocedure Note       Name:  Racheal Arellano   Age:  65 y.o.  :  1965                                          MRN:  [id number]         Date:  9/15/2022      Surgeon: Joya Davila):  Maple Kanner, MD    Procedure: Procedure(s):  COLONOSCOPY    Medications prior to admission:   Prior to Admission medications    Medication Sig Start Date End Date Taking? Authorizing Provider   lisinopril (PRINIVIL;ZESTRIL) 40 MG tablet TAKE 1 TABLET BY MOUTH EVERY DAY 22   Samantha Lemus DO   metoprolol succinate (TOPROL XL) 100 MG extended release tablet TAKE 1 TABLET BY MOUTH EVERY DAY 22   aSmantha Lemus DO   Blood Pressure KIT 1 Dose by Does not apply route daily 20   Ajith Ellis APRN - CNP       Current medications:    Current Facility-Administered Medications   Medication Dose Route Frequency Provider Last Rate Last Admin    lactated ringers infusion   IntraVENous Continuous Maple Kanner,  mL/hr at 09/15/22 0920 New Bag at 09/15/22 0920       Allergies:  No Known Allergies    Problem List:    Patient Active Problem List   Diagnosis Code    Essential hypertension I10    Cigarette nicotine dependence without complication K58.680    Positive colorectal cancer screening using Cologuard test R19.5       Past Medical History:        Diagnosis Date    Chronic back pain     Hypertension        Past Surgical History:        Procedure Laterality Date    HERNIA REPAIR         Social History:    Social History     Tobacco Use    Smoking status: Every Day     Packs/day: 1.00     Years: 25.00     Pack years: 25.00     Types: Cigarettes    Smokeless tobacco: Never   Substance Use Topics    Alcohol use:  Yes                                Ready to quit: Not Answered  Counseling given: Not Answered      Vital Signs (Current):   Vitals:    22 1535 09/15/22 0903 09/15/22 0917   BP:   (!) 167/80   Pulse:   74   Resp:   20   Temp:   36.7 °C (98.1 °F)   TempSrc: and Nursing notes reviewed  Airway: Mallampati: II  TM distance: >3 FB   Neck ROM: full  Mouth opening: > = 3 FB   Dental:          Pulmonary:normal exam  breath sounds clear to auscultation  (+) current smoker          Patient smoked on day of surgery. Cardiovascular:  Exercise tolerance: good (>4 METS),   (+) hypertension:,       ECG reviewed  Rhythm: regular  Rate: normal                    Neuro/Psych:   Negative Neuro/Psych ROS               ROS comment: CHRONIC BACK PAIN GI/Hepatic/Renal: Neg GI/Hepatic/Renal ROS  (+) bowel prep,           Endo/Other: Negative Endo/Other ROS                    Abdominal:       Abdomen: soft. Vascular: negative vascular ROS. Other Findings:           Anesthesia Plan      MAC     ASA 2       Induction: intravenous. Anesthetic plan and risks discussed with patient. Plan discussed with attending.                     QING Fontenot - CRNA   9/15/2022

## 2022-09-19 LAB — SURGICAL PATHOLOGY REPORT: NORMAL

## 2022-09-22 ENCOUNTER — TELEPHONE (OUTPATIENT)
Dept: GASTROENTEROLOGY | Age: 57
End: 2022-09-22

## 2022-09-22 NOTE — TELEPHONE ENCOUNTER
----- Message from Berkley Miranda MD sent at 9/22/2022  7:57 AM EDT -----  Please notify patient:    Hyperplastic and tubular adenomas were noted. Tubular adenoma is a pre-cancerous lesion. Repeat in colonoscopy 3 years.

## 2022-11-09 ENCOUNTER — OFFICE VISIT (OUTPATIENT)
Dept: PRIMARY CARE CLINIC | Age: 57
End: 2022-11-09
Payer: COMMERCIAL

## 2022-11-09 VITALS
WEIGHT: 195 LBS | DIASTOLIC BLOOD PRESSURE: 78 MMHG | BODY MASS INDEX: 25.73 KG/M2 | HEART RATE: 83 BPM | OXYGEN SATURATION: 98 % | SYSTOLIC BLOOD PRESSURE: 133 MMHG | TEMPERATURE: 99.7 F

## 2022-11-09 DIAGNOSIS — H66.001 ACUTE SUPPURATIVE OTITIS MEDIA OF RIGHT EAR WITHOUT SPONTANEOUS RUPTURE OF TYMPANIC MEMBRANE, RECURRENCE NOT SPECIFIED: Primary | ICD-10-CM

## 2022-11-09 PROCEDURE — 99213 OFFICE O/P EST LOW 20 MIN: CPT | Performed by: NURSE PRACTITIONER

## 2022-11-09 PROCEDURE — 3074F SYST BP LT 130 MM HG: CPT | Performed by: NURSE PRACTITIONER

## 2022-11-09 PROCEDURE — 3078F DIAST BP <80 MM HG: CPT | Performed by: NURSE PRACTITIONER

## 2022-11-09 RX ORDER — AMOXICILLIN 875 MG/1
875 TABLET, COATED ORAL 2 TIMES DAILY
Qty: 20 TABLET | Refills: 0 | Status: SHIPPED | OUTPATIENT
Start: 2022-11-09 | End: 2022-11-19

## 2022-11-09 ASSESSMENT — ENCOUNTER SYMPTOMS
SORE THROAT: 1
COUGH: 0
FACIAL SWELLING: 0
SINUS PRESSURE: 0
SINUS PAIN: 0

## 2022-11-09 NOTE — PROGRESS NOTES
Chief Complaint:   Otalgia (Right ear pain) and Pharyngitis (Sore throat started on monday)      History of Present Illness   Source of history provided by:  patient. Lakshmi Daniel is a 65 y.o. old male presenting to the walk in clinic for evaluation of right ear pain and pressure and sore throat x 2 days. Denies any discharge from the ear canal. Has tried taking  OTC without relief. Denies any fever, chills, CP, SOB, abdominal pain, neck stiffness, rash, or lethargy. Reports himself as a smoker. ROS   Review of Systems   Constitutional:  Negative for fever. HENT:  Positive for ear pain and sore throat. Negative for ear discharge, facial swelling, mouth sores, sinus pressure, sinus pain and sneezing. Respiratory:  Negative for cough. Cardiovascular: Negative. Skin:  Negative for rash. Past Surgical History:  has a past surgical history that includes hernia repair and Colonoscopy (N/A, 9/15/2022). Social History:  reports that he has been smoking cigarettes. He has a 25.00 pack-year smoking history. He has never used smokeless tobacco. He reports current alcohol use. Family History: family history is not on file. Allergies: Patient has no known allergies. Physical Exam       VS:  /78   Pulse 83   Temp 99.7 °F (37.6 °C)   Wt 195 lb (88.5 kg)   SpO2 98%   BMI 25.73 kg/m²      Constitutional:  Alert, development consistent with age. Ears:   Normal pinna bilaterally. Right TM is bulging, dull with injection and small amount of purulent yellow fluid behind TM. No obvious perforation. Left TM appears pearly gray. EACs bilaterally within normal limits. Nose: No congestion of the nasal mucosa. Sinuses are nontender to palpation. Throat:  Posterior pharynx with beefy red erythema, no tonsillar hypertrophy, uvula is midline, no exudate. Airway patient. Neck:  Normal ROM. Supple. No adenopathy. Respiratory: Occasional wheezing noted.   No rales, or rhonchi  CV: Regular rate and rhythm, normal heart sounds, without  ectopy, gallops, or rubs. Skin:  Moist and warm without rashes or lesions. Lymphatic: No lymphangitis or adenopathy noted. Neurological:  Oriented. Motor functions intact. Lab / Imaging Results   (All laboratory and radiology results have been personally reviewed by myself)  Labs:  No results found for this visit on 11/09/22. Medical Decision Making   Pt non-toxic, in no apparent distress and stable at time of discharge. Te / Mendel Nam was seen today for otalgia and pharyngitis. Diagnoses and all orders for this visit:    Acute suppurative otitis media of right ear without spontaneous rupture of tympanic membrane, recurrence not specified  -     amoxicillin (AMOXIL) 875 MG tablet; Take 1 tablet by mouth 2 times daily for 10 days      - Treating today with amoxicillin; administration/ADRs and probiotics discussed. - Symptomatic treatment discussed including use of warm compresses, over-the-counter acetaminophen and/or ibuprofen as labeled for pain. - Wheezing noted on exam, advise smoking cessation.  - Follow-up PCP for continued symptoms. ED for any worsening of today's symptoms or concerns. Tia Urbina, APRN - CNP     *This report was transcribed using voice recognition software. Every effort was made to ensure accuracy; however, inadvertent computerized transcription errors may be present.

## 2022-11-09 NOTE — LETTER
[address]    [facility] [address]  Phone: [phone]  Fax: [address] [facility],  [phone], APRN - CNP        November 9, 2022     Patient: Sadia Ortiz   YOB: 1965   Date of Visit: 11/9/2022       To Whom It May Concern: It is my medical opinion that Ari Fleming may return to work on November 10, 2022 with improved symptoms and remains afebrile. If you have any questions or concerns, please don't hesitate to call.     Sincerely,        Raj Jennings, APRN - CNP

## 2023-01-06 ENCOUNTER — OFFICE VISIT (OUTPATIENT)
Dept: FAMILY MEDICINE CLINIC | Age: 58
End: 2023-01-06
Payer: COMMERCIAL

## 2023-01-06 VITALS
DIASTOLIC BLOOD PRESSURE: 76 MMHG | SYSTOLIC BLOOD PRESSURE: 112 MMHG | RESPIRATION RATE: 18 BRPM | BODY MASS INDEX: 26.53 KG/M2 | HEART RATE: 70 BPM | HEIGHT: 73 IN | WEIGHT: 200.2 LBS

## 2023-01-06 DIAGNOSIS — Z86.010 HISTORY OF COLON POLYPS: ICD-10-CM

## 2023-01-06 DIAGNOSIS — Z00.00 GENERAL MEDICAL EXAM: Primary | ICD-10-CM

## 2023-01-06 DIAGNOSIS — I10 ESSENTIAL HYPERTENSION: ICD-10-CM

## 2023-01-06 DIAGNOSIS — F17.210 CIGARETTE NICOTINE DEPENDENCE WITHOUT COMPLICATION: ICD-10-CM

## 2023-01-06 PROBLEM — Z86.0100 HISTORY OF COLON POLYPS: Status: ACTIVE | Noted: 2021-12-17

## 2023-01-06 PROCEDURE — 3074F SYST BP LT 130 MM HG: CPT | Performed by: STUDENT IN AN ORGANIZED HEALTH CARE EDUCATION/TRAINING PROGRAM

## 2023-01-06 PROCEDURE — 99396 PREV VISIT EST AGE 40-64: CPT | Performed by: STUDENT IN AN ORGANIZED HEALTH CARE EDUCATION/TRAINING PROGRAM

## 2023-01-06 PROCEDURE — 3078F DIAST BP <80 MM HG: CPT | Performed by: STUDENT IN AN ORGANIZED HEALTH CARE EDUCATION/TRAINING PROGRAM

## 2023-01-06 ASSESSMENT — PATIENT HEALTH QUESTIONNAIRE - PHQ9
4. FEELING TIRED OR HAVING LITTLE ENERGY: 0
7. TROUBLE CONCENTRATING ON THINGS, SUCH AS READING THE NEWSPAPER OR WATCHING TELEVISION: 0
3. TROUBLE FALLING OR STAYING ASLEEP: 0
SUM OF ALL RESPONSES TO PHQ QUESTIONS 1-9: 0
SUM OF ALL RESPONSES TO PHQ9 QUESTIONS 1 & 2: 0
SUM OF ALL RESPONSES TO PHQ QUESTIONS 1-9: 0
2. FEELING DOWN, DEPRESSED OR HOPELESS: 0
10. IF YOU CHECKED OFF ANY PROBLEMS, HOW DIFFICULT HAVE THESE PROBLEMS MADE IT FOR YOU TO DO YOUR WORK, TAKE CARE OF THINGS AT HOME, OR GET ALONG WITH OTHER PEOPLE: 0
1. LITTLE INTEREST OR PLEASURE IN DOING THINGS: 0
8. MOVING OR SPEAKING SO SLOWLY THAT OTHER PEOPLE COULD HAVE NOTICED. OR THE OPPOSITE, BEING SO FIGETY OR RESTLESS THAT YOU HAVE BEEN MOVING AROUND A LOT MORE THAN USUAL: 0
SUM OF ALL RESPONSES TO PHQ QUESTIONS 1-9: 0
6. FEELING BAD ABOUT YOURSELF - OR THAT YOU ARE A FAILURE OR HAVE LET YOURSELF OR YOUR FAMILY DOWN: 0
5. POOR APPETITE OR OVEREATING: 0
9. THOUGHTS THAT YOU WOULD BE BETTER OFF DEAD, OR OF HURTING YOURSELF: 0
SUM OF ALL RESPONSES TO PHQ QUESTIONS 1-9: 0

## 2023-01-06 ASSESSMENT — ENCOUNTER SYMPTOMS
SORE THROAT: 0
COUGH: 0
WHEEZING: 0
ABDOMINAL PAIN: 0
SINUS PAIN: 0
BACK PAIN: 0
VOMITING: 0
DIARRHEA: 0
NAUSEA: 0

## 2023-01-06 NOTE — PATIENT INSTRUCTIONS
SURVEY:    You may be receiving a survey from [employer] regarding your visit today. Please complete the survey to enable us to provide the highest quality of care to you and your family. If you cannot score us a very good on any question, please call the office to discuss how we could of made your experience a very good one. Thank you.       Clinical Care Team:     Dr. Saul Alberts, PEPE      ClericalTeam:     [id number] [facility]

## 2023-01-06 NOTE — PROGRESS NOTES
HPI Notes    Name: Oscar Jimenez  : 1965         Chief Complaint:     Chief Complaint   Patient presents with    Annual Exam     Feels good no complaints       History of Present Illness:        HPI    This is a 50-year-old man presenting for an annual well visit. He has only a few medical problems including well-controlled essential hypertension, nicotine dependence, positive Cologuard resulting in a diagnostic colon scope revealing precancerous tubular adenomas. He is scheduled for another colonoscopy in . He overall feels well, and has had no major changes to his health since prior outpatient visit and no complaints today. Past Medical History:     Past Medical History:   Diagnosis Date    Chronic back pain     Hypertension       Reviewed all health maintenance requirements and ordered appropriate tests  Health Maintenance Due   Topic Date Due    Pneumococcal 0-64 years Vaccine (1 - PCV) Never done    DTaP/Tdap/Td vaccine (1 - Tdap) Never done    Lipids  Never done    Shingles vaccine (1 of 2) Never done    Low dose CT lung screening  Never done    COVID-19 Vaccine (3 - Booster for Pfizer series) 06/15/2021    Flu vaccine (1) Never done       Past Surgical History:     Past Surgical History:   Procedure Laterality Date    COLONOSCOPY N/A 9/15/2022    COLONOSCOPY POLYPECTOMY SNARE/COLD BIOPSY performed by Berkley Miranda MD at [address] [place]          Medications:       Prior to Admission medications    Medication Sig Start Date End Date Taking? Authorizing Provider   lisinopril (PRINIVIL;ZESTRIL) 40 MG tablet TAKE 1 TABLET BY MOUTH EVERY DAY 22  Yes Tavon Neff,    metoprolol succinate (TOPROL XL) 100 MG extended release tablet TAKE 1 TABLET BY MOUTH EVERY DAY 22  Yes Tavon Neff DO   Blood Pressure KIT 1 Dose by Does not apply route daily 20   QING Sanders - CNP        Allergies:       Patient has no known allergies.     Social History:     Tobacco:    reports that he has been smoking cigarettes. He has a 25.00 pack-year smoking history. He has never used smokeless tobacco.  Alcohol:      reports current alcohol use. Drug Use:  has no history on file for drug use. Family History:     No family history on file. Review of Systems:         Review of Systems   Constitutional:  Negative for fever. HENT:  Negative for sinus pain, sneezing and sore throat. Respiratory:  Negative for cough and wheezing. Cardiovascular:  Negative for chest pain. Gastrointestinal:  Negative for abdominal pain, diarrhea, nausea and vomiting. Genitourinary:  Negative for penile discharge. Musculoskeletal:  Negative for back pain. Skin:  Negative for rash. Hematological:  Negative for adenopathy. Psychiatric/Behavioral:  Negative for sleep disturbance. Physical Exam:     Vitals:  /76 (Site: Right Upper Arm, Position: Sitting, Cuff Size: Large Adult)   Pulse 70   Resp 18   Ht 6' 1\" (1.854 m)   Wt 200 lb 3.2 oz (90.8 kg)   BMI 26.41 kg/m²       Physical Exam  Vitals and nursing note reviewed. Constitutional:       General: He is not in acute distress. Appearance: Normal appearance. He is obese. HENT:      Right Ear: Tympanic membrane, ear canal and external ear normal. There is no impacted cerumen. Left Ear: Tympanic membrane, ear canal and external ear normal. There is no impacted cerumen. Nose: Nose normal. No congestion. Mouth/Throat:      Mouth: Mucous membranes are moist.      Pharynx: Oropharynx is clear. No oropharyngeal exudate. Eyes:      General: No scleral icterus. Conjunctiva/sclera: Conjunctivae normal.      Pupils: Pupils are equal, round, and reactive to light. Cardiovascular:      Rate and Rhythm: Normal rate and regular rhythm. Pulses: Normal pulses. Heart sounds: Normal heart sounds. No murmur heard.   Pulmonary:      Effort: Pulmonary effort is normal. No respiratory distress. Breath sounds: Normal breath sounds. No wheezing. Abdominal:      General: Abdomen is flat. Bowel sounds are normal.      Palpations: Abdomen is soft. Tenderness: There is no abdominal tenderness. Musculoskeletal:         General: Normal range of motion. Cervical back: Normal range of motion and neck supple. No rigidity. No muscular tenderness. Lymphadenopathy:      Cervical: No cervical adenopathy. Skin:     General: Skin is warm and dry. Capillary Refill: Capillary refill takes less than 2 seconds. Neurological:      General: No focal deficit present. Mental Status: He is alert and oriented to person, place, and time. Mental status is at baseline. Psychiatric:         Mood and Affect: Mood normal.         Behavior: Behavior normal.         Thought Content: Thought content normal.               Data:     Lab Results   Component Value Date/Time     01/29/2021 11:37 AM    K 4.0 01/29/2021 11:37 AM     01/29/2021 11:37 AM    CO2 25 01/29/2021 11:37 AM    BUN 12 01/29/2021 11:37 AM    CREATININE 0.93 01/29/2021 11:37 AM    GLUCOSE 91 01/29/2021 11:37 AM    PROT 7.1 01/29/2021 11:37 AM    LABALBU 4.4 01/29/2021 11:37 AM    BILITOT 0.18 01/29/2021 11:37 AM    ALKPHOS 81 01/29/2021 11:37 AM    AST 31 01/29/2021 11:37 AM    ALT 43 01/29/2021 11:37 AM     No results found for: WBC, RBC, HGB, HCT, MCV, MCH, MCHC, RDW, PLT, MPV  No results found for: TSH  No results found for: CHOL, LDL, HDL, PSA, LABA1C       Assessment & Plan        Diagnosis Orders   1. General medical exam        2. History of colon polyps        3. Essential hypertension        4. Cigarette nicotine dependence without complication            Overall, this is a relatively healthy 63year old man. His medical problems are stable and well controlled. He is UTD on most preventative measures. Return to office in 6 months for interval visit.          Completed Refills   Requested Prescriptions      No prescriptions requested or ordered in this encounter     No follow-ups on file. No orders of the defined types were placed in this encounter. No orders of the defined types were placed in this encounter. Patient Instructions     SURVEY:    You may be receiving a survey from [employer] regarding your visit today. Please complete the survey to enable us to provide the highest quality of care to you and your family. If you cannot score us a very good on any question, please call the office to discuss how we could of made your experience a very good one. Thank you.       Clinical Care Team:     Dr. Keeley Bartlett, PEPE      ClericalTeam:     Aspen Rodriguez   Electronically signed by Ysabel Longoria DO on 1/6/2023 at 3:06 PM           Completed Refills   Requested Prescriptions      No prescriptions requested or ordered in this encounter

## 2023-01-24 DIAGNOSIS — I10 ESSENTIAL HYPERTENSION: ICD-10-CM

## 2023-01-24 DIAGNOSIS — Z76.0 MEDICATION REFILL: ICD-10-CM

## 2023-01-24 RX ORDER — METOPROLOL SUCCINATE 100 MG/1
TABLET, EXTENDED RELEASE ORAL
Qty: 90 TABLET | Refills: 1 | Status: SHIPPED | OUTPATIENT
Start: 2023-01-24

## 2023-01-24 RX ORDER — LISINOPRIL 40 MG/1
TABLET ORAL
Qty: 90 TABLET | Refills: 1 | Status: SHIPPED | OUTPATIENT
Start: 2023-01-24

## 2023-01-24 NOTE — TELEPHONE ENCOUNTER
Last OV: 1/6/2023  general exam   Last RX:    Next scheduled apt: 7/7/2023  6 month HTN           Surescript requesting a refill

## 2023-07-14 ENCOUNTER — OFFICE VISIT (OUTPATIENT)
Dept: FAMILY MEDICINE CLINIC | Age: 58
End: 2023-07-14
Payer: COMMERCIAL

## 2023-07-14 VITALS
DIASTOLIC BLOOD PRESSURE: 72 MMHG | WEIGHT: 195 LBS | SYSTOLIC BLOOD PRESSURE: 136 MMHG | OXYGEN SATURATION: 98 % | HEART RATE: 78 BPM | BODY MASS INDEX: 25.73 KG/M2

## 2023-07-14 DIAGNOSIS — I10 ESSENTIAL HYPERTENSION: Primary | ICD-10-CM

## 2023-07-14 DIAGNOSIS — Z13.220 SCREENING FOR HYPERLIPIDEMIA: ICD-10-CM

## 2023-07-14 PROCEDURE — 99213 OFFICE O/P EST LOW 20 MIN: CPT | Performed by: STUDENT IN AN ORGANIZED HEALTH CARE EDUCATION/TRAINING PROGRAM

## 2023-07-14 PROCEDURE — 3078F DIAST BP <80 MM HG: CPT | Performed by: STUDENT IN AN ORGANIZED HEALTH CARE EDUCATION/TRAINING PROGRAM

## 2023-07-14 PROCEDURE — 3075F SYST BP GE 130 - 139MM HG: CPT | Performed by: STUDENT IN AN ORGANIZED HEALTH CARE EDUCATION/TRAINING PROGRAM

## 2023-07-14 SDOH — ECONOMIC STABILITY: FOOD INSECURITY: WITHIN THE PAST 12 MONTHS, YOU WORRIED THAT YOUR FOOD WOULD RUN OUT BEFORE YOU GOT MONEY TO BUY MORE.: NEVER TRUE

## 2023-07-14 SDOH — ECONOMIC STABILITY: HOUSING INSECURITY
IN THE LAST 12 MONTHS, WAS THERE A TIME WHEN YOU DID NOT HAVE A STEADY PLACE TO SLEEP OR SLEPT IN A SHELTER (INCLUDING NOW)?: NO

## 2023-07-14 SDOH — ECONOMIC STABILITY: INCOME INSECURITY: HOW HARD IS IT FOR YOU TO PAY FOR THE VERY BASICS LIKE FOOD, HOUSING, MEDICAL CARE, AND HEATING?: NOT HARD AT ALL

## 2023-07-14 SDOH — ECONOMIC STABILITY: FOOD INSECURITY: WITHIN THE PAST 12 MONTHS, THE FOOD YOU BOUGHT JUST DIDN'T LAST AND YOU DIDN'T HAVE MONEY TO GET MORE.: NEVER TRUE

## 2023-07-14 ASSESSMENT — ENCOUNTER SYMPTOMS
DIARRHEA: 0
SORE THROAT: 0
BACK PAIN: 0
COUGH: 0
SINUS PAIN: 0
VOMITING: 0
NAUSEA: 0
WHEEZING: 0
ABDOMINAL PAIN: 0

## 2023-07-14 NOTE — PROGRESS NOTES
HPI Notes    Name: Helen Douglas  : 1965         Chief Complaint:     Chief Complaint   Patient presents with    Hypertension     6 month follow up       History of Present Illness:      HPI    This is a 72-year-old man presenting for a interval medical examination. His medical history significant for hypertension which is well controlled on lisinopril 40 mg p.o. daily as well as Toprol- mg p.o. daily. He has no complaints or concerns today and is tolerating both of these medications very well. He is due for lipid panel screening today. Past Medical History:     Past Medical History:   Diagnosis Date    Chronic back pain     Hypertension       Reviewed all health maintenance requirements and ordered appropriate tests  Health Maintenance Due   Topic Date Due    Pneumococcal 0-64 years Vaccine (1 - PCV) Never done    DTaP/Tdap/Td vaccine (1 - Tdap) Never done    Lipids  Never done    Shingles vaccine (1 of 2) Never done    Low dose CT lung screening &/or counseling  Never done    COVID-19 Vaccine (3 - Booster for Pfizer series) 06/15/2021       Past Surgical History:     Past Surgical History:   Procedure Laterality Date    COLONOSCOPY N/A 9/15/2022    COLONOSCOPY POLYPECTOMY SNARE/COLD BIOPSY performed by Lucy Uribe MD at [address] [place] [address]          Medications:       Prior to Admission medications    Medication Sig Start Date End Date Taking? Authorizing Provider   lisinopril (PRINIVIL;ZESTRIL) 40 MG tablet TAKE 1 TABLET BY MOUTH EVERY DAY 23  Yes Kishore Gurrola DO   metoprolol succinate (TOPROL XL) 100 MG extended release tablet TAKE 1 TABLET BY MOUTH EVERY DAY 23  Yes Kishore Gurrola DO   Blood Pressure KIT 1 Dose by Does not apply route daily 20   QING Treadwell - CNP        Allergies:       Patient has no known allergies. Social History:     Tobacco:    reports that he has been smoking cigarettes.  He has a 25.00 pack-year smoking

## 2023-07-14 NOTE — PATIENT INSTRUCTIONS
SURVEY:    You may be receiving a survey from [employer] regarding your visit today. You may get this in the mail, through your MyChart or in your email. Please complete the survey to enable us to provide the highest quality of care to you and your family. If you cannot score us as very good ( 5 Stars) on any question, please feel free to call the office to discuss how we could have made your experience exceptional.     Thank you.     Clinical Care Team:  Dr. Araseli Beckman, DO Pete Acosta LPN    Triage:  Conner Martinez, [address] [facility] Team:  Bertram Cardona

## 2023-07-25 DIAGNOSIS — Z76.0 MEDICATION REFILL: ICD-10-CM

## 2023-07-25 DIAGNOSIS — I10 ESSENTIAL HYPERTENSION: ICD-10-CM

## 2023-07-25 RX ORDER — METOPROLOL SUCCINATE 100 MG/1
TABLET, EXTENDED RELEASE ORAL
Qty: 90 TABLET | Refills: 1 | Status: SHIPPED | OUTPATIENT
Start: 2023-07-25

## 2023-07-25 RX ORDER — LISINOPRIL 40 MG/1
TABLET ORAL
Qty: 90 TABLET | Refills: 1 | Status: SHIPPED | OUTPATIENT
Start: 2023-07-25

## 2023-07-25 NOTE — TELEPHONE ENCOUNTER
Last OV 7/14/23 for HTN  Requesting refill on lisinopril and metoprolol thru [facility] script  Next OV 1/12/24

## 2024-01-12 ENCOUNTER — OFFICE VISIT (OUTPATIENT)
Dept: FAMILY MEDICINE CLINIC | Age: 59
End: 2024-01-12
Payer: COMMERCIAL

## 2024-01-12 ENCOUNTER — HOSPITAL ENCOUNTER (OUTPATIENT)
Age: 59
Discharge: HOME | End: 2024-01-12
Payer: COMMERCIAL

## 2024-01-12 VITALS
BODY MASS INDEX: 25.84 KG/M2 | DIASTOLIC BLOOD PRESSURE: 82 MMHG | OXYGEN SATURATION: 99 % | HEART RATE: 85 BPM | WEIGHT: 195 LBS | HEIGHT: 73 IN | SYSTOLIC BLOOD PRESSURE: 138 MMHG

## 2024-01-12 DIAGNOSIS — I10 ESSENTIAL HYPERTENSION: ICD-10-CM

## 2024-01-12 DIAGNOSIS — Z12.2 SCREENING FOR LUNG CANCER: ICD-10-CM

## 2024-01-12 DIAGNOSIS — Z00.00 GENERAL MEDICAL EXAM: Primary | ICD-10-CM

## 2024-01-12 DIAGNOSIS — F17.210 CIGARETTE NICOTINE DEPENDENCE WITHOUT COMPLICATION: ICD-10-CM

## 2024-01-12 DIAGNOSIS — Z13.220 SCREENING FOR HYPERLIPIDEMIA: ICD-10-CM

## 2024-01-12 LAB
ALBUMIN SERPL-MCNC: 4.5 G/DL (ref 3.5–5.2)
ALP SERPL-CCNC: 79 U/L (ref 40–129)
ALT SERPL-CCNC: 46 U/L (ref 5–41)
ANION GAP SERPL CALCULATED.3IONS-SCNC: 10 MMOL/L (ref 9–17)
AST SERPL-CCNC: 32 U/L
BASOPHILS # BLD: 0.03 K/UL (ref 0–0.2)
BASOPHILS NFR BLD: 0 % (ref 0–2)
BILIRUB SERPL-MCNC: 0.4 MG/DL (ref 0.3–1.2)
BUN SERPL-MCNC: 19 MG/DL (ref 6–20)
BUN/CREAT SERPL: 19 (ref 9–20)
CALCIUM SERPL-MCNC: 9.6 MG/DL (ref 8.6–10.4)
CHLORIDE SERPL-SCNC: 103 MMOL/L (ref 98–107)
CO2 SERPL-SCNC: 23 MMOL/L (ref 20–31)
CREAT SERPL-MCNC: 1 MG/DL (ref 0.7–1.2)
EOSINOPHIL # BLD: 0.11 K/UL (ref 0–0.4)
EOSINOPHILS RELATIVE PERCENT: 1 % (ref 0–5)
ERYTHROCYTE [DISTWIDTH] IN BLOOD BY AUTOMATED COUNT: 11.8 % (ref 12.1–15.2)
GFR SERPL CREATININE-BSD FRML MDRD: >60 ML/MIN/1.73M2
GLUCOSE SERPL-MCNC: 110 MG/DL (ref 70–99)
HCT VFR BLD AUTO: 46 % (ref 41–53)
HGB BLD-MCNC: 15.9 G/DL (ref 13.5–17.5)
IMM GRANULOCYTES # BLD AUTO: 0.03 K/UL (ref 0–0.3)
IMM GRANULOCYTES NFR BLD: 0 % (ref 0–5)
LYMPHOCYTES NFR BLD: 3.45 K/UL (ref 1–4.8)
LYMPHOCYTES RELATIVE PERCENT: 40 % (ref 13–44)
MCH RBC QN AUTO: 33.1 PG (ref 26–34)
MCHC RBC AUTO-ENTMCNC: 34.6 G/DL (ref 31–37)
MCV RBC AUTO: 95.6 FL (ref 80–100)
MONOCYTES NFR BLD: 0.81 K/UL (ref 0–1)
MONOCYTES NFR BLD: 9 % (ref 5–9)
NEUTROPHILS NFR BLD: 50 % (ref 39–75)
NEUTS SEG NFR BLD: 4.19 K/UL (ref 2.1–6.5)
PLATELET # BLD AUTO: 204 K/UL (ref 140–450)
PMV BLD AUTO: 8.9 FL (ref 6–12)
POTASSIUM SERPL-SCNC: 4.7 MMOL/L (ref 3.7–5.3)
PROT SERPL-MCNC: 7.7 G/DL (ref 6.4–8.3)
RBC # BLD AUTO: 4.81 M/UL (ref 4.5–5.9)
SODIUM SERPL-SCNC: 136 MMOL/L (ref 135–144)
WBC OTHER # BLD: 8.6 K/UL (ref 3.5–11)

## 2024-01-12 PROCEDURE — 99396 PREV VISIT EST AGE 40-64: CPT | Performed by: STUDENT IN AN ORGANIZED HEALTH CARE EDUCATION/TRAINING PROGRAM

## 2024-01-12 PROCEDURE — 80053 COMPREHEN METABOLIC PANEL: CPT

## 2024-01-12 PROCEDURE — 36415 COLL VENOUS BLD VENIPUNCTURE: CPT

## 2024-01-12 PROCEDURE — 3075F SYST BP GE 130 - 139MM HG: CPT | Performed by: STUDENT IN AN ORGANIZED HEALTH CARE EDUCATION/TRAINING PROGRAM

## 2024-01-12 PROCEDURE — 80061 LIPID PANEL: CPT

## 2024-01-12 PROCEDURE — 85025 COMPLETE CBC W/AUTO DIFF WBC: CPT

## 2024-01-12 PROCEDURE — 3079F DIAST BP 80-89 MM HG: CPT | Performed by: STUDENT IN AN ORGANIZED HEALTH CARE EDUCATION/TRAINING PROGRAM

## 2024-01-12 ASSESSMENT — PATIENT HEALTH QUESTIONNAIRE - PHQ9
SUM OF ALL RESPONSES TO PHQ9 QUESTIONS 1 & 2: 0
SUM OF ALL RESPONSES TO PHQ QUESTIONS 1-9: 0
1. LITTLE INTEREST OR PLEASURE IN DOING THINGS: 0
2. FEELING DOWN, DEPRESSED OR HOPELESS: 0

## 2024-01-12 ASSESSMENT — ENCOUNTER SYMPTOMS
DIARRHEA: 0
ABDOMINAL PAIN: 0
SORE THROAT: 0
COUGH: 0
VOMITING: 0
WHEEZING: 0
SINUS PAIN: 0
NAUSEA: 0

## 2024-01-12 NOTE — PROGRESS NOTES
pack-year smoking history. He has never used smokeless tobacco.  Alcohol:      reports current alcohol use.  Drug Use:  has no history on file for drug use.    Family History:     No family history on file.    Review of Systems:         Review of Systems   Constitutional:  Negative for fever.   HENT:  Negative for sinus pain, sneezing and sore throat.    Respiratory:  Negative for cough and wheezing.    Cardiovascular:  Negative for chest pain.   Gastrointestinal:  Negative for abdominal pain, diarrhea, nausea and vomiting.   Skin:  Negative for rash.   Hematological:  Negative for adenopathy.   Psychiatric/Behavioral:  Negative for sleep disturbance.            Physical Exam:     Vitals:  /82   Pulse 85   Ht 1.854 m (6' 1\")   Wt 88.5 kg (195 lb)   SpO2 99%   BMI 25.73 kg/m²       Physical Exam  Vitals and nursing note reviewed.   Constitutional:       General: He is not in acute distress.     Appearance: Normal appearance. He is normal weight.   HENT:      Right Ear: Tympanic membrane, ear canal and external ear normal. There is no impacted cerumen.      Left Ear: Tympanic membrane, ear canal and external ear normal. There is no impacted cerumen.      Nose: Nose normal. No congestion.      Mouth/Throat:      Mouth: Mucous membranes are moist.      Pharynx: Oropharynx is clear. No oropharyngeal exudate.   Eyes:      General: No scleral icterus.     Conjunctiva/sclera: Conjunctivae normal.      Pupils: Pupils are equal, round, and reactive to light.   Cardiovascular:      Rate and Rhythm: Normal rate and regular rhythm.      Pulses: Normal pulses.      Heart sounds: Normal heart sounds. No murmur heard.  Pulmonary:      Effort: Pulmonary effort is normal. No respiratory distress.      Breath sounds: Normal breath sounds. No wheezing or rhonchi.   Abdominal:      General: Abdomen is flat. Bowel sounds are normal.      Palpations: Abdomen is soft.      Tenderness: There is no abdominal tenderness.

## 2024-01-12 NOTE — PATIENT INSTRUCTIONS
SURVEY:    You may be receiving a survey from [facility] regarding your visit today.    You may get this in the mail, through your MyChart or in your email.     Please complete the survey to enable us to provide the highest quality of care to you and your family.    If you cannot score us as very good ( 5 Stars) on any question, please feel free to call the office to discuss how we could have made your experience exceptional.     Thank you.    Clinical Care Team:  Dr. Dejon Lemus, DO Lucia Ray LPN    Triage:  Laura Rodgers CMA    Clerical Team:  Laura Chandra

## 2024-01-13 LAB
CHOLEST SERPL-MCNC: 243 MG/DL
CHOLESTEROL/HDL RATIO: 5
HDLC SERPL-MCNC: 49 MG/DL
LDLC SERPL CALC-MCNC: 164 MG/DL (ref 0–130)
TRIGL SERPL-MCNC: 148 MG/DL

## 2024-01-16 ENCOUNTER — TELEPHONE (OUTPATIENT)
Dept: FAMILY MEDICINE CLINIC | Age: 59
End: 2024-01-16

## 2024-01-16 DIAGNOSIS — E78.2 MIXED HYPERLIPIDEMIA: Primary | ICD-10-CM

## 2024-01-16 RX ORDER — ATORVASTATIN CALCIUM 40 MG/1
40 TABLET, FILM COATED ORAL DAILY
Qty: 30 TABLET | Refills: 5 | Status: SHIPPED | OUTPATIENT
Start: 2024-01-16

## 2024-01-16 NOTE — TELEPHONE ENCOUNTER
Please contact patient and inform him that he actually has a very elevated risk of having heart attack or stroke in the next 10 years, 70%.  I would recommend he begin taking atorvastatin 40 mg p.o. daily.  He may have an outpatient visit to discuss this therapy with me if he wishes.

## 2024-01-19 NOTE — TELEPHONE ENCOUNTER
Advised pt of provider's notes , voiced understanding and will  the medication called in to the pharmacy

## 2024-01-21 DIAGNOSIS — Z76.0 MEDICATION REFILL: ICD-10-CM

## 2024-01-21 DIAGNOSIS — I10 ESSENTIAL HYPERTENSION: ICD-10-CM

## 2024-01-22 RX ORDER — METOPROLOL SUCCINATE 100 MG/1
TABLET, EXTENDED RELEASE ORAL
Qty: 90 TABLET | Refills: 1 | Status: SHIPPED | OUTPATIENT
Start: 2024-01-22

## 2024-01-22 RX ORDER — LISINOPRIL 40 MG/1
TABLET ORAL
Qty: 90 TABLET | Refills: 1 | Status: SHIPPED | OUTPATIENT
Start: 2024-01-22

## 2024-01-22 NOTE — TELEPHONE ENCOUNTER
Last OV: 1/12/2024  general exam   Last RX:    Next scheduled apt: 7/19/2024   6 months           Surescript requesting a refill

## 2024-04-20 DIAGNOSIS — E78.2 MIXED HYPERLIPIDEMIA: ICD-10-CM

## 2024-04-22 RX ORDER — ATORVASTATIN CALCIUM 40 MG/1
40 TABLET, FILM COATED ORAL DAILY
Qty: 90 TABLET | Refills: 1 | Status: SHIPPED | OUTPATIENT
Start: 2024-04-22

## 2024-04-22 NOTE — TELEPHONE ENCOUNTER
Last OV: 1/12/2024    Next scheduled apt: 7/19/2024      Surescripts requesting refill  Medication pending

## 2024-07-19 ENCOUNTER — OFFICE VISIT (OUTPATIENT)
Dept: FAMILY MEDICINE CLINIC | Age: 59
End: 2024-07-19
Payer: COMMERCIAL

## 2024-07-19 VITALS
DIASTOLIC BLOOD PRESSURE: 70 MMHG | HEART RATE: 75 BPM | OXYGEN SATURATION: 97 % | SYSTOLIC BLOOD PRESSURE: 130 MMHG | WEIGHT: 195 LBS | BODY MASS INDEX: 25.73 KG/M2

## 2024-07-19 DIAGNOSIS — E78.2 MIXED HYPERLIPIDEMIA: ICD-10-CM

## 2024-07-19 DIAGNOSIS — I10 ESSENTIAL HYPERTENSION: Primary | ICD-10-CM

## 2024-07-19 DIAGNOSIS — Z13.1 SCREENING FOR DIABETES MELLITUS (DM): ICD-10-CM

## 2024-07-19 LAB — HBA1C MFR BLD: 5.7 %

## 2024-07-19 PROCEDURE — 99214 OFFICE O/P EST MOD 30 MIN: CPT | Performed by: STUDENT IN AN ORGANIZED HEALTH CARE EDUCATION/TRAINING PROGRAM

## 2024-07-19 PROCEDURE — 3078F DIAST BP <80 MM HG: CPT | Performed by: STUDENT IN AN ORGANIZED HEALTH CARE EDUCATION/TRAINING PROGRAM

## 2024-07-19 PROCEDURE — 83036 HEMOGLOBIN GLYCOSYLATED A1C: CPT | Performed by: STUDENT IN AN ORGANIZED HEALTH CARE EDUCATION/TRAINING PROGRAM

## 2024-07-19 PROCEDURE — 3075F SYST BP GE 130 - 139MM HG: CPT | Performed by: STUDENT IN AN ORGANIZED HEALTH CARE EDUCATION/TRAINING PROGRAM

## 2024-07-19 SDOH — ECONOMIC STABILITY: INCOME INSECURITY: HOW HARD IS IT FOR YOU TO PAY FOR THE VERY BASICS LIKE FOOD, HOUSING, MEDICAL CARE, AND HEATING?: NOT HARD AT ALL

## 2024-07-19 SDOH — ECONOMIC STABILITY: FOOD INSECURITY: WITHIN THE PAST 12 MONTHS, THE FOOD YOU BOUGHT JUST DIDN'T LAST AND YOU DIDN'T HAVE MONEY TO GET MORE.: NEVER TRUE

## 2024-07-19 SDOH — ECONOMIC STABILITY: FOOD INSECURITY: WITHIN THE PAST 12 MONTHS, YOU WORRIED THAT YOUR FOOD WOULD RUN OUT BEFORE YOU GOT MONEY TO BUY MORE.: NEVER TRUE

## 2024-07-19 ASSESSMENT — ENCOUNTER SYMPTOMS
BACK PAIN: 0
VOMITING: 0
ABDOMINAL PAIN: 0
SINUS PAIN: 0
DIARRHEA: 0
SORE THROAT: 0
NAUSEA: 0
COUGH: 0
WHEEZING: 0

## 2024-07-19 NOTE — PATIENT INSTRUCTIONS
SURVEY:    You may be receiving a survey from [facility] regarding your visit today.    You may get this in the mail, through your MyChart or in your email.     Please complete the survey to enable us to provide the highest quality of care to you and your family.    If you cannot score us as very good ( 5 Stars) on any question, please feel free to call the office to discuss how we could have made your experience exceptional.     Thank you.    Clinical Care Team:  Dr. Dejon Lemus, DO Lucia Ray LPN    Triage:  Laura Rodgers CMA    Clerical Team:  Laura Chandra

## 2024-07-19 NOTE — PROGRESS NOTES
smokeless tobacco.  Alcohol:      reports current alcohol use.  Drug Use:  has no history on file for drug use.    Family History:     No family history on file.    Review of Systems:         Review of Systems   Constitutional:  Negative for fever.   HENT:  Negative for sinus pain, sneezing and sore throat.    Respiratory:  Negative for cough and wheezing.    Cardiovascular:  Negative for chest pain.   Gastrointestinal:  Negative for abdominal pain, diarrhea, nausea and vomiting.   Musculoskeletal:  Negative for back pain.   Skin:  Negative for rash.   Hematological:  Negative for adenopathy.   Psychiatric/Behavioral:  Negative for sleep disturbance.            Physical Exam:     Vitals:  /70   Pulse 75   Wt 88.5 kg (195 lb)   SpO2 97%   BMI 25.73 kg/m²       Physical Exam  Vitals and nursing note reviewed.   Constitutional:       General: He is not in acute distress.     Appearance: Normal appearance. He is normal weight.   Cardiovascular:      Rate and Rhythm: Normal rate and regular rhythm.      Pulses: Normal pulses.      Heart sounds: Normal heart sounds. No murmur heard.  Pulmonary:      Effort: Pulmonary effort is normal. No respiratory distress.      Breath sounds: Normal breath sounds. No wheezing or rhonchi.   Musculoskeletal:         General: No swelling or tenderness. Normal range of motion.   Skin:     General: Skin is warm and dry.      Capillary Refill: Capillary refill takes less than 2 seconds.   Neurological:      General: No focal deficit present.      Mental Status: He is alert and oriented to person, place, and time. Mental status is at baseline.   Psychiatric:         Mood and Affect: Mood normal.         Behavior: Behavior normal.         Thought Content: Thought content normal.                 Data:     Lab Results   Component Value Date/Time     01/12/2024 03:30 PM    K 4.7 01/12/2024 03:30 PM     01/12/2024 03:30 PM    CO2 23 01/12/2024 03:30 PM    BUN 19 01/12/2024

## 2024-07-20 DIAGNOSIS — I10 ESSENTIAL HYPERTENSION: ICD-10-CM

## 2024-07-20 DIAGNOSIS — Z76.0 MEDICATION REFILL: ICD-10-CM

## 2024-07-22 RX ORDER — LISINOPRIL 40 MG/1
TABLET ORAL
Qty: 90 TABLET | Refills: 1 | Status: SHIPPED | OUTPATIENT
Start: 2024-07-22

## 2024-07-22 RX ORDER — METOPROLOL SUCCINATE 100 MG/1
TABLET, EXTENDED RELEASE ORAL
Qty: 90 TABLET | Refills: 1 | Status: SHIPPED | OUTPATIENT
Start: 2024-07-22

## 2024-07-22 NOTE — TELEPHONE ENCOUNTER
Last OV: 7/19/2024    Next scheduled apt: Visit date not found      Surescripts requesting refill  Medication pending

## 2025-01-17 ENCOUNTER — OFFICE VISIT (OUTPATIENT)
Dept: FAMILY MEDICINE CLINIC | Age: 60
End: 2025-01-17
Payer: COMMERCIAL

## 2025-01-17 VITALS
OXYGEN SATURATION: 96 % | HEIGHT: 73 IN | SYSTOLIC BLOOD PRESSURE: 138 MMHG | DIASTOLIC BLOOD PRESSURE: 80 MMHG | WEIGHT: 194 LBS | HEART RATE: 73 BPM | BODY MASS INDEX: 25.71 KG/M2

## 2025-01-17 DIAGNOSIS — E78.2 MIXED HYPERLIPIDEMIA: ICD-10-CM

## 2025-01-17 DIAGNOSIS — I10 ESSENTIAL HYPERTENSION: ICD-10-CM

## 2025-01-17 DIAGNOSIS — Z00.00 ANNUAL PHYSICAL EXAM: Primary | ICD-10-CM

## 2025-01-17 DIAGNOSIS — Z76.0 MEDICATION REFILL: ICD-10-CM

## 2025-01-17 PROCEDURE — 99396 PREV VISIT EST AGE 40-64: CPT | Performed by: STUDENT IN AN ORGANIZED HEALTH CARE EDUCATION/TRAINING PROGRAM

## 2025-01-17 PROCEDURE — 3075F SYST BP GE 130 - 139MM HG: CPT | Performed by: STUDENT IN AN ORGANIZED HEALTH CARE EDUCATION/TRAINING PROGRAM

## 2025-01-17 PROCEDURE — 3079F DIAST BP 80-89 MM HG: CPT | Performed by: STUDENT IN AN ORGANIZED HEALTH CARE EDUCATION/TRAINING PROGRAM

## 2025-01-17 SDOH — ECONOMIC STABILITY: FOOD INSECURITY: WITHIN THE PAST 12 MONTHS, YOU WORRIED THAT YOUR FOOD WOULD RUN OUT BEFORE YOU GOT MONEY TO BUY MORE.: NEVER TRUE

## 2025-01-17 SDOH — ECONOMIC STABILITY: FOOD INSECURITY: WITHIN THE PAST 12 MONTHS, THE FOOD YOU BOUGHT JUST DIDN'T LAST AND YOU DIDN'T HAVE MONEY TO GET MORE.: NEVER TRUE

## 2025-01-17 ASSESSMENT — PATIENT HEALTH QUESTIONNAIRE - PHQ9
SUM OF ALL RESPONSES TO PHQ9 QUESTIONS 1 & 2: 0
SUM OF ALL RESPONSES TO PHQ QUESTIONS 1-9: 0
1. LITTLE INTEREST OR PLEASURE IN DOING THINGS: NOT AT ALL
2. FEELING DOWN, DEPRESSED OR HOPELESS: NOT AT ALL

## 2025-01-17 ASSESSMENT — ENCOUNTER SYMPTOMS
WHEEZING: 0
BACK PAIN: 0
ABDOMINAL PAIN: 0
SINUS PAIN: 0
COUGH: 0
NAUSEA: 0
DIARRHEA: 0
SORE THROAT: 0
VOMITING: 0

## 2025-01-17 NOTE — PATIENT INSTRUCTIONS
SURVEY:    You may be receiving a survey from [facility] today.    You may get this in the mail, through your MyChart or in your email.     Please complete the survey to enable us to provide the highest quality of care to you and your family.    If you cannot score us as very good ( 5 Stars) on any question, please feel free to call the office to discuss how we could have made your experience exceptional.     Thank you.    Clinical Care Team:  Dr. Dejon Lemus, DO Lucia Ray LPN    Triage:  Laura Rodgers CMA    Clerical Team:  Laura Chandra

## 2025-01-22 DIAGNOSIS — Z76.0 MEDICATION REFILL: ICD-10-CM

## 2025-01-22 DIAGNOSIS — I10 ESSENTIAL HYPERTENSION: ICD-10-CM

## 2025-01-22 RX ORDER — METOPROLOL SUCCINATE 100 MG/1
TABLET, EXTENDED RELEASE ORAL
Qty: 90 TABLET | Refills: 1 | Status: SHIPPED | OUTPATIENT
Start: 2025-01-22

## 2025-01-22 RX ORDER — LISINOPRIL 40 MG/1
TABLET ORAL
Qty: 90 TABLET | Refills: 1 | Status: SHIPPED | OUTPATIENT
Start: 2025-01-22

## 2025-01-22 NOTE — TELEPHONE ENCOUNTER
Last OV 1/17/25 for chronics  Requesting refill on metoprolol and lisinopril via sure script  Next OV 7/18/25

## 2025-07-18 ENCOUNTER — OFFICE VISIT (OUTPATIENT)
Dept: FAMILY MEDICINE CLINIC | Age: 60
End: 2025-07-18

## 2025-07-18 VITALS
OXYGEN SATURATION: 98 % | DIASTOLIC BLOOD PRESSURE: 70 MMHG | HEART RATE: 64 BPM | SYSTOLIC BLOOD PRESSURE: 122 MMHG | BODY MASS INDEX: 26.12 KG/M2 | WEIGHT: 198 LBS

## 2025-07-18 DIAGNOSIS — E78.2 MIXED HYPERLIPIDEMIA: ICD-10-CM

## 2025-07-18 DIAGNOSIS — I10 ESSENTIAL HYPERTENSION: Primary | ICD-10-CM

## 2025-07-18 DIAGNOSIS — F17.210 CIGARETTE NICOTINE DEPENDENCE WITHOUT COMPLICATION: ICD-10-CM

## 2025-07-18 DIAGNOSIS — Z76.0 MEDICATION REFILL: ICD-10-CM

## 2025-07-18 DIAGNOSIS — Z13.1 SCREENING FOR DIABETES MELLITUS (DM): ICD-10-CM

## 2025-07-18 RX ORDER — METOPROLOL SUCCINATE 100 MG/1
100 TABLET, EXTENDED RELEASE ORAL DAILY
Qty: 90 TABLET | Refills: 1 | Status: SHIPPED | OUTPATIENT
Start: 2025-07-18

## 2025-07-18 RX ORDER — LISINOPRIL 40 MG/1
40 TABLET ORAL DAILY
Qty: 90 TABLET | Refills: 1 | Status: SHIPPED | OUTPATIENT
Start: 2025-07-18

## 2025-07-18 ASSESSMENT — ENCOUNTER SYMPTOMS
NAUSEA: 0
DIARRHEA: 0
SINUS PAIN: 0
SORE THROAT: 0
WHEEZING: 0
COUGH: 0
ABDOMINAL PAIN: 0
BACK PAIN: 0
VOMITING: 0

## 2025-07-18 NOTE — PATIENT INSTRUCTIONS
SURVEY:    You may be receiving a [facility].    You may get this in the mail, through your MyChart or in your email.     Please complete the survey to enable us to provide the highest quality of care to you and your family.    If you cannot score us as very good ( 5 Stars) on any question, please feel free to call the office to discuss how we could have made your experience exceptional.     Thank you.    Clinical Care Team:  Dr. Dejon Lemus, DO Lucia Ray LPN    Triage:  Laura Rodgers CMA    Clerical Team:  Laura Chandra

## 2025-07-18 NOTE — PROGRESS NOTES
HPI Notes    Name: Celestino Earl  : 1965         Chief Complaint:     Chief Complaint   Patient presents with    Hypertension     6 month follow up.        History of Present Illness:      HPI    This is a 60-year-old man with well-controlled essential hypertension presenting for reevaluation of this condition.  His blood pressure is normotensive and he is tolerating his Toprol-XL and lisinopril quite well.    He also has mixed hyperlipidemia with a 17% year cardiovascular risk. He states he will not change his lifestyle and will not treat this problem.     Past Medical History:     Past Medical History:   Diagnosis Date    Chronic back pain     Hypertension       Reviewed all health maintenance requirements and ordered appropriate tests  Health Maintenance Due   Topic Date Due    Hepatitis B vaccine (1 of 3 - 19+ 3-dose series) Never done    DTaP/Tdap/Td vaccine (1 - Tdap) Never done    Pneumococcal 50+ years Vaccine (1 of 2 - PCV) Never done    Shingles vaccine (1 of 2) Never done    Lung Cancer Screening &/or Counseling  Never done    COVID-19 Vaccine (3 - 2024-25 season) 2024    A1C test (Diabetic or Prediabetic)  2025       Past Surgical History:     Past Surgical History:   Procedure Laterality Date    COLONOSCOPY N/A 9/15/2022    COLONOSCOPY POLYPECTOMY SNARE/COLD BIOPSY performed by Nanette Mora MD at [facility] ENDOSCOPY    HERNIA REPAIR          Medications:       Prior to Admission medications    Medication Sig Start Date End Date Taking? Authorizing Provider   lisinopril (PRINIVIL;ZESTRIL) 40 MG tablet Take 1 tablet by mouth daily 25  Yes Dejon Lemus DO   metoprolol succinate (TOPROL XL) 100 MG extended release tablet Take 1 tablet by mouth daily 25  Yes Dejon Lemus DO   Blood Pressure KIT 1 Dose by Does not apply route daily 20   Bandar Trinidad DNP        Allergies:       Patient has no known allergies.    Social History:     Tobacco:    reports

## (undated) DEVICE — SOLUTION IRRIG 1000ML STRL H2O USP PLAS POUR BTL

## (undated) DEVICE — ELECTRODE PT RET AD L9FT HI MOIST COND ADH HYDRGEL CORDED

## (undated) DEVICE — HYBRID TUBING WITH CO2 CONNECTION FOR OLYMPUS 140/160/180/190 SERIES GI ENDOSCOPES WITH OLYMPUS AFU-100 , OLYMPUS OFP: Brand: ENDOGATOR HYBRID IRRIGATION TUBING

## (undated) DEVICE — TRAP SPEC POLYP REM STRNR CLN DSGN MAGNIFYING WIND DISP

## (undated) DEVICE — CLIP INT L235CM WRK CHN DIA2.8MM OPN 11MM BRAID CATH ROT BX/10

## (undated) DEVICE — Device: Brand: DISPOSABLE ELECTROSURGICAL SNARE

## (undated) DEVICE — 4-PORT MANIFOLD: Brand: NEPTUNE 2

## (undated) DEVICE — ENDO KIT W/SYRINGE: Brand: MEDLINE INDUSTRIES, INC.

## (undated) DEVICE — FORCEPS BX L240CM JAW DIA2.8MM L CAP W/ NDL MIC MESH TOOTH

## (undated) DEVICE — Device: Brand: DEFENDO VALVE AND CONNECTOR KIT